# Patient Record
Sex: FEMALE | Race: WHITE | Employment: OTHER | URBAN - METROPOLITAN AREA
[De-identification: names, ages, dates, MRNs, and addresses within clinical notes are randomized per-mention and may not be internally consistent; named-entity substitution may affect disease eponyms.]

---

## 2017-08-29 PROBLEM — R30.0 DYSURIA: Status: ACTIVE | Noted: 2017-08-29

## 2017-08-29 PROBLEM — I10 ESSENTIAL HYPERTENSION: Status: ACTIVE | Noted: 2017-08-29

## 2017-08-29 PROBLEM — N18.30 CHRONIC KIDNEY DISEASE, STAGE III (MODERATE) (HCC): Status: ACTIVE | Noted: 2017-08-29

## 2017-08-29 PROBLEM — M06.09 RHEUMATOID ARTHRITIS OF MULTIPLE SITES WITH NEGATIVE RHEUMATOID FACTOR (HCC): Status: ACTIVE | Noted: 2017-08-29

## 2017-08-29 PROBLEM — N17.1 ACUTE RENAL FAILURE WITH ACUTE CORTICAL NECROSIS (HCC): Status: ACTIVE | Noted: 2017-08-29

## 2017-09-06 ENCOUNTER — HOSPITAL ENCOUNTER (OUTPATIENT)
Dept: WOMENS IMAGING | Age: 72
Discharge: HOME | End: 2017-09-06
Attending: INTERNAL MEDICINE | Admitting: INTERNAL MEDICINE

## 2017-09-06 DIAGNOSIS — Z12.31 SCREENING MAMMOGRAM, ENCOUNTER FOR: ICD-10-CM

## 2017-09-06 DIAGNOSIS — Z78.0 ASYMPTOMATIC MENOPAUSAL STATE: ICD-10-CM

## 2017-09-12 ENCOUNTER — HOSPITAL ENCOUNTER (OUTPATIENT)
Dept: ULTRASOUND IMAGING | Age: 72
Discharge: HOME | End: 2017-09-12
Attending: INTERNAL MEDICINE | Admitting: INTERNAL MEDICINE

## 2017-09-12 DIAGNOSIS — N64.89 BREAST ASYMMETRY: ICD-10-CM

## 2017-09-12 DIAGNOSIS — R92.8 ABNORMAL MAMMOGRAM: ICD-10-CM

## 2018-04-21 PROBLEM — R79.89 ELEVATED TROPONIN: Status: ACTIVE | Noted: 2018-04-21

## 2018-04-21 PROBLEM — R77.8 ELEVATED TROPONIN: Status: ACTIVE | Noted: 2018-04-21

## 2018-04-21 PROBLEM — I50.9 CHF WITH UNKNOWN LVEF (HCC): Status: ACTIVE | Noted: 2018-04-21

## 2018-04-22 PROBLEM — E87.6 HYPOKALEMIA: Status: ACTIVE | Noted: 2018-04-22

## 2018-04-22 PROBLEM — D64.9 ANEMIA: Status: ACTIVE | Noted: 2018-04-22

## 2018-04-22 PROBLEM — I25.10 CAD (CORONARY ARTERY DISEASE): Status: ACTIVE | Noted: 2018-04-22

## 2018-04-23 PROBLEM — R79.89 ELEVATED TROPONIN: Status: RESOLVED | Noted: 2018-04-21 | Resolved: 2018-04-23

## 2018-04-23 PROBLEM — R77.8 ELEVATED TROPONIN: Status: RESOLVED | Noted: 2018-04-21 | Resolved: 2018-04-23

## 2018-04-23 PROBLEM — E87.6 HYPOKALEMIA: Status: RESOLVED | Noted: 2018-04-22 | Resolved: 2018-04-23

## 2018-04-24 ENCOUNTER — CARE COORDINATION (OUTPATIENT)
Dept: CASE MANAGEMENT | Age: 73
End: 2018-04-24

## 2018-04-25 ENCOUNTER — CARE COORDINATION (OUTPATIENT)
Dept: CASE MANAGEMENT | Age: 73
End: 2018-04-25

## 2018-05-01 ENCOUNTER — CARE COORDINATION (OUTPATIENT)
Dept: CASE MANAGEMENT | Age: 73
End: 2018-05-01

## 2018-05-03 ENCOUNTER — CARE COORDINATION (OUTPATIENT)
Dept: CASE MANAGEMENT | Age: 73
End: 2018-05-03

## 2018-05-14 ENCOUNTER — OFFICE VISIT (OUTPATIENT)
Dept: CARDIOLOGY CLINIC | Age: 73
End: 2018-05-14

## 2018-05-14 VITALS
WEIGHT: 173 LBS | HEART RATE: 72 BPM | HEIGHT: 63 IN | SYSTOLIC BLOOD PRESSURE: 132 MMHG | BODY MASS INDEX: 30.65 KG/M2 | DIASTOLIC BLOOD PRESSURE: 70 MMHG

## 2018-05-14 DIAGNOSIS — Z95.9 STATUS POST ARTERIAL STENT: Primary | ICD-10-CM

## 2018-05-14 DIAGNOSIS — N18.30 CHRONIC KIDNEY DISEASE, STAGE III (MODERATE) (HCC): ICD-10-CM

## 2018-05-14 DIAGNOSIS — I50.22 CHRONIC SYSTOLIC CONGESTIVE HEART FAILURE (HCC): ICD-10-CM

## 2018-05-14 DIAGNOSIS — I25.10 ASCVD (ARTERIOSCLEROTIC CARDIOVASCULAR DISEASE): ICD-10-CM

## 2018-05-14 DIAGNOSIS — I10 ESSENTIAL HYPERTENSION: ICD-10-CM

## 2018-05-14 PROCEDURE — 1036F TOBACCO NON-USER: CPT | Performed by: INTERNAL MEDICINE

## 2018-05-14 PROCEDURE — 1123F ACP DISCUSS/DSCN MKR DOCD: CPT | Performed by: INTERNAL MEDICINE

## 2018-05-14 PROCEDURE — 93000 ELECTROCARDIOGRAM COMPLETE: CPT | Performed by: INTERNAL MEDICINE

## 2018-05-14 PROCEDURE — G8427 DOCREV CUR MEDS BY ELIG CLIN: HCPCS | Performed by: INTERNAL MEDICINE

## 2018-05-14 PROCEDURE — 4040F PNEUMOC VAC/ADMIN/RCVD: CPT | Performed by: INTERNAL MEDICINE

## 2018-05-14 PROCEDURE — 99214 OFFICE O/P EST MOD 30 MIN: CPT | Performed by: INTERNAL MEDICINE

## 2018-05-14 PROCEDURE — 3017F COLORECTAL CA SCREEN DOC REV: CPT | Performed by: INTERNAL MEDICINE

## 2018-05-14 PROCEDURE — G8598 ASA/ANTIPLAT THER USED: HCPCS | Performed by: INTERNAL MEDICINE

## 2018-05-14 PROCEDURE — G8399 PT W/DXA RESULTS DOCUMENT: HCPCS | Performed by: INTERNAL MEDICINE

## 2018-05-14 PROCEDURE — 3014F SCREEN MAMMO DOC REV: CPT | Performed by: INTERNAL MEDICINE

## 2018-05-14 PROCEDURE — 1111F DSCHRG MED/CURRENT MED MERGE: CPT | Performed by: INTERNAL MEDICINE

## 2018-05-14 PROCEDURE — 1090F PRES/ABSN URINE INCON ASSESS: CPT | Performed by: INTERNAL MEDICINE

## 2018-05-14 PROCEDURE — G8417 CALC BMI ABV UP PARAM F/U: HCPCS | Performed by: INTERNAL MEDICINE

## 2018-05-14 RX ORDER — METOPROLOL SUCCINATE 100 MG/1
100 TABLET, EXTENDED RELEASE ORAL DAILY
Qty: 30 TABLET | Refills: 3 | Status: SHIPPED | OUTPATIENT
Start: 2018-05-14 | End: 2018-09-04 | Stop reason: ALTCHOICE

## 2018-05-14 RX ORDER — ASPIRIN 81 MG/1
81 TABLET, CHEWABLE ORAL DAILY
Qty: 30 TABLET | Refills: 0 | Status: SHIPPED | OUTPATIENT
Start: 2018-05-14 | End: 2018-06-12 | Stop reason: SDUPTHER

## 2018-05-14 RX ORDER — NIFEDIPINE 60 MG/1
1 TABLET, FILM COATED, EXTENDED RELEASE ORAL DAILY
COMMUNITY
Start: 2018-04-24 | End: 2018-05-14 | Stop reason: SDUPTHER

## 2018-05-14 RX ORDER — PRASUGREL 10 MG/1
10 TABLET, FILM COATED ORAL DAILY
Qty: 30 TABLET | Refills: 3 | Status: SHIPPED | OUTPATIENT
Start: 2018-05-14 | End: 2018-09-04 | Stop reason: ALTCHOICE

## 2018-05-14 RX ORDER — NIFEDIPINE 60 MG/1
60 TABLET, FILM COATED, EXTENDED RELEASE ORAL DAILY
Qty: 30 TABLET | Refills: 5 | Status: SHIPPED | OUTPATIENT
Start: 2018-05-14 | End: 2018-09-04 | Stop reason: ALTCHOICE

## 2018-05-14 RX ORDER — ESTRADIOL 0.1 MG/G
CREAM VAGINAL DAILY
COMMUNITY
Start: 2018-02-20 | End: 2018-09-04 | Stop reason: ALTCHOICE

## 2018-05-22 ENCOUNTER — TELEPHONE (OUTPATIENT)
Dept: CARDIOLOGY CLINIC | Age: 73
End: 2018-05-22

## 2018-05-23 ENCOUNTER — TELEPHONE (OUTPATIENT)
Dept: CARDIOLOGY CLINIC | Age: 73
End: 2018-05-23

## 2018-06-11 ENCOUNTER — TELEPHONE (OUTPATIENT)
Dept: CARDIOLOGY CLINIC | Age: 73
End: 2018-06-11

## 2018-06-11 ENCOUNTER — PROCEDURE VISIT (OUTPATIENT)
Dept: CARDIOLOGY CLINIC | Age: 73
End: 2018-06-11

## 2018-06-11 DIAGNOSIS — I10 ESSENTIAL HYPERTENSION: ICD-10-CM

## 2018-06-11 DIAGNOSIS — I25.10 ASCVD (ARTERIOSCLEROTIC CARDIOVASCULAR DISEASE): ICD-10-CM

## 2018-06-11 DIAGNOSIS — I50.22 CHRONIC SYSTOLIC CONGESTIVE HEART FAILURE (HCC): Primary | ICD-10-CM

## 2018-06-11 PROCEDURE — 93308 TTE F-UP OR LMTD: CPT | Performed by: INTERNAL MEDICINE

## 2018-06-12 RX ORDER — ASPIRIN 81 MG/1
81 TABLET, CHEWABLE ORAL DAILY
Qty: 30 TABLET | Refills: 5 | Status: SHIPPED | OUTPATIENT
Start: 2018-06-12 | End: 2019-05-21 | Stop reason: ALTCHOICE

## 2018-08-15 ENCOUNTER — TELEPHONE (OUTPATIENT)
Dept: CARDIOLOGY CLINIC | Age: 73
End: 2018-08-15

## 2018-08-16 NOTE — TELEPHONE ENCOUNTER
I left a message for pt to call me back. I would like to see if she would like to go to [employer], if so she can call [phone].

## 2018-09-04 ENCOUNTER — OFFICE VISIT (OUTPATIENT)
Dept: CARDIOLOGY CLINIC | Age: 73
End: 2018-09-04

## 2018-09-04 VITALS
HEIGHT: 63 IN | WEIGHT: 175 LBS | HEART RATE: 72 BPM | DIASTOLIC BLOOD PRESSURE: 70 MMHG | BODY MASS INDEX: 31.01 KG/M2 | SYSTOLIC BLOOD PRESSURE: 146 MMHG

## 2018-09-04 DIAGNOSIS — I50.32 CHRONIC DIASTOLIC CONGESTIVE HEART FAILURE (HCC): ICD-10-CM

## 2018-09-04 DIAGNOSIS — I25.10 ASCVD (ARTERIOSCLEROTIC CARDIOVASCULAR DISEASE): Primary | ICD-10-CM

## 2018-09-04 DIAGNOSIS — I10 ESSENTIAL HYPERTENSION: ICD-10-CM

## 2018-09-04 PROCEDURE — 99214 OFFICE O/P EST MOD 30 MIN: CPT | Performed by: INTERNAL MEDICINE

## 2018-09-04 PROCEDURE — 1101F PT FALLS ASSESS-DOCD LE1/YR: CPT | Performed by: INTERNAL MEDICINE

## 2018-09-04 PROCEDURE — 1090F PRES/ABSN URINE INCON ASSESS: CPT | Performed by: INTERNAL MEDICINE

## 2018-09-04 PROCEDURE — G8598 ASA/ANTIPLAT THER USED: HCPCS | Performed by: INTERNAL MEDICINE

## 2018-09-04 PROCEDURE — 3014F SCREEN MAMMO DOC REV: CPT | Performed by: INTERNAL MEDICINE

## 2018-09-04 PROCEDURE — G8399 PT W/DXA RESULTS DOCUMENT: HCPCS | Performed by: INTERNAL MEDICINE

## 2018-09-04 PROCEDURE — G8417 CALC BMI ABV UP PARAM F/U: HCPCS | Performed by: INTERNAL MEDICINE

## 2018-09-04 PROCEDURE — 3017F COLORECTAL CA SCREEN DOC REV: CPT | Performed by: INTERNAL MEDICINE

## 2018-09-04 PROCEDURE — 1036F TOBACCO NON-USER: CPT | Performed by: INTERNAL MEDICINE

## 2018-09-04 PROCEDURE — 1123F ACP DISCUSS/DSCN MKR DOCD: CPT | Performed by: INTERNAL MEDICINE

## 2018-09-04 PROCEDURE — G8427 DOCREV CUR MEDS BY ELIG CLIN: HCPCS | Performed by: INTERNAL MEDICINE

## 2018-09-04 PROCEDURE — 4040F PNEUMOC VAC/ADMIN/RCVD: CPT | Performed by: INTERNAL MEDICINE

## 2018-09-04 RX ORDER — NIFEDIPINE 90 MG/1
90 TABLET, EXTENDED RELEASE ORAL DAILY
Qty: 30 TABLET | Refills: 3 | Status: ON HOLD | OUTPATIENT
Start: 2018-09-04 | End: 2020-12-14 | Stop reason: HOSPADM

## 2018-09-04 RX ORDER — CLOPIDOGREL BISULFATE 75 MG/1
75 TABLET ORAL DAILY
Qty: 30 TABLET | Refills: 3 | Status: SHIPPED | OUTPATIENT
Start: 2018-09-04 | End: 2019-01-02 | Stop reason: SDUPTHER

## 2018-09-04 RX ORDER — METOPROLOL SUCCINATE 50 MG/1
50 TABLET, EXTENDED RELEASE ORAL DAILY
COMMUNITY
End: 2019-05-07 | Stop reason: ALTCHOICE

## 2018-09-04 NOTE — PROGRESS NOTES
last echo.  Hypertension      History reviewed. No pertinent surgical history. Family History   Problem Relation Age of Onset   [facility] Cancer Mother     Diabetes Mother     Coronary Art Dis Father     Heart Attack Father     Heart Disease Father     Coronary Art Dis Sister     Heart Attack Sister     Heart Disease Sister     Stroke Sister     High Blood Pressure Brother     High Cholesterol Brother     Kidney Disease Brother      Social History   Substance Use Topics    Smoking status: Never Smoker    Smokeless tobacco: Never Used    Alcohol use No        Review of Systems:   · Constitutional: No Fever or Weight Loss   · Eyes: No Decreased Vision  · ENT: No Headaches, Hearing Loss or Vertigo  · Cardiovascular: as per note above   · Respiratory: No cough or wheezing and as per note above. · Gastrointestinal: No abdominal pain, appetite loss, blood in stools, constipation, diarrhea or heartburn  · Genitourinary: No dysuria, trouble voiding, or hematuria  · Musculoskeletal:  None  · Integumentary: No rash or pruritis  · Neurological: No TIA or stroke symptoms  · Psychiatric: No anxiety or depression  · Endocrine: No malaise, fatigue or temperature intolerance  · Hematologic/Lymphatic: No bleeding problems, blood clots or swollen lymph nodes  · Allergic/Immunologic: No nasal congestion or hives    Objective:      Physical Exam:  BP (!) 146/70 (Site: Left Arm, Position: Sitting, Cuff Size: Large Adult)   Pulse 72   Ht 5' 3\" (1.6 m)   Wt 175 lb (79.4 kg)   BMI 31.00 kg/m²   Wt Readings from Last 3 Encounters:   09/04/18 175 lb (79.4 kg)   05/14/18 173 lb (78.5 kg)   04/21/18 167 lb 15.9 oz (76.2 kg)     Body mass index is 31 kg/m². Vitals:    09/04/18 1017   BP: (!) 146/70   Pulse:         General Appearance:  No distress, conversant  Constitutional:  Well developed, Well nourished, No acute distress, Non-toxic appearance.    HENT:  Normocephalic, Atraumatic, Bilateral external ears normal, Oropharynx

## 2018-09-11 ENCOUNTER — HOSPITAL ENCOUNTER (OUTPATIENT)
Dept: WOMENS IMAGING | Age: 73
Discharge: HOME | End: 2018-09-11
Attending: INTERNAL MEDICINE | Admitting: INTERNAL MEDICINE

## 2018-09-11 DIAGNOSIS — Z12.31 ENCOUNTER FOR SCREENING MAMMOGRAM FOR BREAST CANCER: ICD-10-CM

## 2018-09-11 DIAGNOSIS — N95.1 SYMPTOMATIC MENOPAUSAL OR FEMALE CLIMACTERIC STATES: ICD-10-CM

## 2019-01-02 RX ORDER — CLOPIDOGREL BISULFATE 75 MG/1
75 TABLET ORAL DAILY
Qty: 30 TABLET | Refills: 6 | Status: SHIPPED | OUTPATIENT
Start: 2019-01-02 | End: 2019-01-03 | Stop reason: SDUPTHER

## 2019-01-03 RX ORDER — CLOPIDOGREL BISULFATE 75 MG/1
75 TABLET ORAL DAILY
Qty: 90 TABLET | Refills: 3 | Status: SHIPPED | OUTPATIENT
Start: 2019-01-03 | End: 2020-03-25 | Stop reason: SDUPTHER

## 2019-01-11 ENCOUNTER — HOSPITAL ENCOUNTER (OUTPATIENT)
Age: 74
Discharge: HOME | End: 2019-01-11
Payer: MEDICARE

## 2019-01-11 DIAGNOSIS — I50.32 CHRONIC DIASTOLIC CONGESTIVE HEART FAILURE (HCC): ICD-10-CM

## 2019-01-11 DIAGNOSIS — M06.09 RHEUMATOID ARTHRITIS OF MULTIPLE SITES WITH NEGATIVE RHEUMATOID FACTOR (HCC): ICD-10-CM

## 2019-01-11 DIAGNOSIS — N18.30 CHRONIC KIDNEY DISEASE, STAGE III (MODERATE) (HCC): ICD-10-CM

## 2019-01-11 DIAGNOSIS — I10 ESSENTIAL HYPERTENSION: ICD-10-CM

## 2019-01-11 DIAGNOSIS — R30.0 DYSURIA: ICD-10-CM

## 2019-01-11 DIAGNOSIS — I25.10 ASCVD (ARTERIOSCLEROTIC CARDIOVASCULAR DISEASE): ICD-10-CM

## 2019-01-11 LAB
ALT SERPL-CCNC: 23 U/L (ref 10–40)
ANION GAP SERPL CALCULATED.3IONS-SCNC: 15 MMOL/L (ref 4–16)
AST SERPL-CCNC: 31 IU/L (ref 15–37)
BACTERIA: NEGATIVE /HPF
BASOPHILS ABSOLUTE: 0 K/CU MM
BASOPHILS RELATIVE PERCENT: 0.4 % (ref 0–1)
BILIRUBIN URINE: NEGATIVE MG/DL
BLOOD, URINE: ABNORMAL
BUN BLDV-MCNC: 20 MG/DL (ref 6–23)
BUN BLDV-MCNC: 20 MG/DL (ref 6–23)
CALCIUM SERPL-MCNC: 9.4 MG/DL (ref 8.3–10.6)
CHLORIDE BLD-SCNC: 99 MMOL/L (ref 99–110)
CLARITY: CLEAR
CO2: 25 MMOL/L (ref 21–32)
COLOR: YELLOW
CREAT SERPL-MCNC: 1 MG/DL (ref 0.6–1.1)
CREAT SERPL-MCNC: 1 MG/DL (ref 0.6–1.1)
DIFFERENTIAL TYPE: ABNORMAL
EOSINOPHILS ABSOLUTE: 0.1 K/CU MM
EOSINOPHILS RELATIVE PERCENT: 2.4 % (ref 0–3)
ERYTHROCYTE SEDIMENTATION RATE: 40 MM/HR (ref 0–30)
GFR AFRICAN AMERICAN: >60 ML/MIN/1.73M2
GFR AFRICAN AMERICAN: >60 ML/MIN/1.73M2
GFR NON-AFRICAN AMERICAN: 54 ML/MIN/1.73M2
GFR NON-AFRICAN AMERICAN: 54 ML/MIN/1.73M2
GLUCOSE BLD-MCNC: 99 MG/DL (ref 70–99)
GLUCOSE, URINE: NEGATIVE MG/DL
HCT VFR BLD CALC: 40.4 % (ref 37–47)
HEMOGLOBIN: 12.1 GM/DL (ref 12.5–16)
IMMATURE NEUTROPHIL %: 0.2 % (ref 0–0.43)
KETONES, URINE: NEGATIVE MG/DL
LEUKOCYTE ESTERASE, URINE: ABNORMAL
LYMPHOCYTES ABSOLUTE: 1.1 K/CU MM
LYMPHOCYTES RELATIVE PERCENT: 23.9 % (ref 24–44)
MCH RBC QN AUTO: 30.6 PG (ref 27–31)
MCHC RBC AUTO-ENTMCNC: 30 % (ref 32–36)
MCV RBC AUTO: 102 FL (ref 78–100)
MONOCYTES ABSOLUTE: 0.5 K/CU MM
MONOCYTES RELATIVE PERCENT: 10 % (ref 0–4)
MUCUS: ABNORMAL HPF
NITRITE URINE, QUANTITATIVE: NEGATIVE
NUCLEATED RBC %: 0 %
PDW BLD-RTO: 14.5 % (ref 11.7–14.9)
PH, URINE: 5 (ref 5–8)
PLATELET # BLD: 243 K/CU MM (ref 140–440)
PMV BLD AUTO: 13 FL (ref 7.5–11.1)
POTASSIUM SERPL-SCNC: 4.4 MMOL/L (ref 3.5–5.1)
PROTEIN UA: 30 MG/DL
RBC # BLD: 3.96 M/CU MM (ref 4.2–5.4)
RBC URINE: 38 /HPF (ref 0–6)
SEGMENTED NEUTROPHILS ABSOLUTE COUNT: 2.9 K/CU MM
SEGMENTED NEUTROPHILS RELATIVE PERCENT: 63.1 % (ref 36–66)
SODIUM BLD-SCNC: 139 MMOL/L (ref 135–145)
SPECIFIC GRAVITY UA: 1.02 (ref 1–1.03)
SQUAMOUS EPITHELIAL: 2 /HPF
TOTAL IMMATURE NEUTOROPHIL: 0.01 K/CU MM
TOTAL NUCLEATED RBC: 0 K/CU MM
TRANSITIONAL EPITHELIAL: <1 /HPF
TRICHOMONAS: ABNORMAL /HPF
UROBILINOGEN, URINE: NORMAL MG/DL (ref 0.2–1)
WBC # BLD: 4.6 K/CU MM (ref 4–10.5)
WBC UA: 29 /HPF (ref 0–5)

## 2019-01-11 PROCEDURE — 81001 URINALYSIS AUTO W/SCOPE: CPT

## 2019-01-11 PROCEDURE — 87086 URINE CULTURE/COLONY COUNT: CPT

## 2019-01-11 PROCEDURE — 85025 COMPLETE CBC W/AUTO DIFF WBC: CPT

## 2019-01-11 PROCEDURE — 84460 ALANINE AMINO (ALT) (SGPT): CPT

## 2019-01-11 PROCEDURE — 82565 ASSAY OF CREATININE: CPT

## 2019-01-11 PROCEDURE — 84520 ASSAY OF UREA NITROGEN: CPT

## 2019-01-11 PROCEDURE — 36415 COLL VENOUS BLD VENIPUNCTURE: CPT

## 2019-01-11 PROCEDURE — 84450 TRANSFERASE (AST) (SGOT): CPT

## 2019-01-11 PROCEDURE — 85652 RBC SED RATE AUTOMATED: CPT

## 2019-01-11 PROCEDURE — 80048 BASIC METABOLIC PNL TOTAL CA: CPT

## 2019-01-13 LAB
CULTURE: NORMAL
Lab: NORMAL
REPORT STATUS: NORMAL
SPECIMEN: NORMAL

## 2019-03-12 ENCOUNTER — OFFICE VISIT (OUTPATIENT)
Dept: CARDIOLOGY CLINIC | Age: 74
End: 2019-03-12
Payer: MEDICARE

## 2019-03-12 VITALS
HEIGHT: 63 IN | WEIGHT: 182 LBS | HEART RATE: 74 BPM | DIASTOLIC BLOOD PRESSURE: 68 MMHG | BODY MASS INDEX: 32.25 KG/M2 | SYSTOLIC BLOOD PRESSURE: 130 MMHG

## 2019-03-12 DIAGNOSIS — I25.10 ASCVD (ARTERIOSCLEROTIC CARDIOVASCULAR DISEASE): Primary | ICD-10-CM

## 2019-03-12 DIAGNOSIS — M25.473 ANKLE SWELLING DETERMINED BY EXAMINATION: ICD-10-CM

## 2019-03-12 DIAGNOSIS — N18.30 CHRONIC KIDNEY DISEASE, STAGE III (MODERATE) (HCC): ICD-10-CM

## 2019-03-12 DIAGNOSIS — I10 ESSENTIAL HYPERTENSION: ICD-10-CM

## 2019-03-12 DIAGNOSIS — R30.0 DYSURIA: ICD-10-CM

## 2019-03-12 DIAGNOSIS — I50.32 CHRONIC DIASTOLIC CONGESTIVE HEART FAILURE (HCC): ICD-10-CM

## 2019-03-12 PROCEDURE — G8427 DOCREV CUR MEDS BY ELIG CLIN: HCPCS | Performed by: INTERNAL MEDICINE

## 2019-03-12 PROCEDURE — G8484 FLU IMMUNIZE NO ADMIN: HCPCS | Performed by: INTERNAL MEDICINE

## 2019-03-12 PROCEDURE — 1101F PT FALLS ASSESS-DOCD LE1/YR: CPT | Performed by: INTERNAL MEDICINE

## 2019-03-12 PROCEDURE — 99214 OFFICE O/P EST MOD 30 MIN: CPT | Performed by: INTERNAL MEDICINE

## 2019-03-12 PROCEDURE — G8417 CALC BMI ABV UP PARAM F/U: HCPCS | Performed by: INTERNAL MEDICINE

## 2019-03-12 PROCEDURE — 1090F PRES/ABSN URINE INCON ASSESS: CPT | Performed by: INTERNAL MEDICINE

## 2019-03-12 PROCEDURE — 1036F TOBACCO NON-USER: CPT | Performed by: INTERNAL MEDICINE

## 2019-03-12 PROCEDURE — G8399 PT W/DXA RESULTS DOCUMENT: HCPCS | Performed by: INTERNAL MEDICINE

## 2019-03-12 PROCEDURE — 1123F ACP DISCUSS/DSCN MKR DOCD: CPT | Performed by: INTERNAL MEDICINE

## 2019-03-12 PROCEDURE — G8598 ASA/ANTIPLAT THER USED: HCPCS | Performed by: INTERNAL MEDICINE

## 2019-03-12 PROCEDURE — 3017F COLORECTAL CA SCREEN DOC REV: CPT | Performed by: INTERNAL MEDICINE

## 2019-03-12 PROCEDURE — 4040F PNEUMOC VAC/ADMIN/RCVD: CPT | Performed by: INTERNAL MEDICINE

## 2019-03-27 ENCOUNTER — PROCEDURE VISIT (OUTPATIENT)
Dept: CARDIOLOGY CLINIC | Age: 74
End: 2019-03-27
Payer: MEDICARE

## 2019-03-27 DIAGNOSIS — M25.473 ANKLE SWELLING DETERMINED BY EXAMINATION: Primary | ICD-10-CM

## 2019-03-27 DIAGNOSIS — R30.0 DYSURIA: ICD-10-CM

## 2019-03-27 DIAGNOSIS — I25.10 ASCVD (ARTERIOSCLEROTIC CARDIOVASCULAR DISEASE): ICD-10-CM

## 2019-03-27 DIAGNOSIS — N18.30 CHRONIC KIDNEY DISEASE, STAGE III (MODERATE) (HCC): ICD-10-CM

## 2019-03-27 DIAGNOSIS — I50.32 CHRONIC DIASTOLIC CONGESTIVE HEART FAILURE (HCC): ICD-10-CM

## 2019-03-27 DIAGNOSIS — I10 ESSENTIAL HYPERTENSION: ICD-10-CM

## 2019-03-27 PROCEDURE — 93970 EXTREMITY STUDY: CPT | Performed by: INTERNAL MEDICINE

## 2019-03-29 ENCOUNTER — TELEPHONE (OUTPATIENT)
Dept: CARDIOLOGY CLINIC | Age: 74
End: 2019-03-29

## 2019-05-07 ENCOUNTER — OFFICE VISIT (OUTPATIENT)
Dept: CARDIOLOGY CLINIC | Age: 74
End: 2019-05-07
Payer: MEDICARE

## 2019-05-07 VITALS
DIASTOLIC BLOOD PRESSURE: 80 MMHG | WEIGHT: 176 LBS | BODY MASS INDEX: 31.18 KG/M2 | HEIGHT: 63 IN | SYSTOLIC BLOOD PRESSURE: 176 MMHG | HEART RATE: 74 BPM

## 2019-05-07 DIAGNOSIS — I25.10 ASCVD (ARTERIOSCLEROTIC CARDIOVASCULAR DISEASE): ICD-10-CM

## 2019-05-07 DIAGNOSIS — I10 ESSENTIAL HYPERTENSION: ICD-10-CM

## 2019-05-07 DIAGNOSIS — I50.32 CHRONIC DIASTOLIC CONGESTIVE HEART FAILURE (HCC): ICD-10-CM

## 2019-05-07 DIAGNOSIS — M25.473 ANKLE SWELLING DETERMINED BY EXAMINATION: Primary | ICD-10-CM

## 2019-05-07 DIAGNOSIS — N18.30 CHRONIC KIDNEY DISEASE, STAGE III (MODERATE) (HCC): ICD-10-CM

## 2019-05-07 PROCEDURE — G8427 DOCREV CUR MEDS BY ELIG CLIN: HCPCS | Performed by: INTERNAL MEDICINE

## 2019-05-07 PROCEDURE — G8598 ASA/ANTIPLAT THER USED: HCPCS | Performed by: INTERNAL MEDICINE

## 2019-05-07 PROCEDURE — 1090F PRES/ABSN URINE INCON ASSESS: CPT | Performed by: INTERNAL MEDICINE

## 2019-05-07 PROCEDURE — 99214 OFFICE O/P EST MOD 30 MIN: CPT | Performed by: INTERNAL MEDICINE

## 2019-05-07 PROCEDURE — 4040F PNEUMOC VAC/ADMIN/RCVD: CPT | Performed by: INTERNAL MEDICINE

## 2019-05-07 PROCEDURE — 1123F ACP DISCUSS/DSCN MKR DOCD: CPT | Performed by: INTERNAL MEDICINE

## 2019-05-07 PROCEDURE — G8399 PT W/DXA RESULTS DOCUMENT: HCPCS | Performed by: INTERNAL MEDICINE

## 2019-05-07 PROCEDURE — 1036F TOBACCO NON-USER: CPT | Performed by: INTERNAL MEDICINE

## 2019-05-07 PROCEDURE — 3017F COLORECTAL CA SCREEN DOC REV: CPT | Performed by: INTERNAL MEDICINE

## 2019-05-07 PROCEDURE — G8417 CALC BMI ABV UP PARAM F/U: HCPCS | Performed by: INTERNAL MEDICINE

## 2019-05-07 RX ORDER — CARVEDILOL 25 MG/1
25 TABLET ORAL DAILY
Qty: 90 TABLET | Refills: 1 | Status: SHIPPED | OUTPATIENT
Start: 2019-05-07 | End: 2019-10-28 | Stop reason: SDUPTHER

## 2019-05-07 RX ORDER — CHLORTHALIDONE 25 MG/1
25 TABLET ORAL DAILY
Qty: 30 TABLET | Refills: 3 | Status: SHIPPED | OUTPATIENT
Start: 2019-05-07 | End: 2019-05-21 | Stop reason: SDUPTHER

## 2019-05-07 NOTE — PROGRESS NOTES
CARDIOLOGY  NOTE    Chief Complaint: follow-up for cardiomyopathy at this for cardiovascular disease    HPI:   [facility] is a 74y.o. year old who has history as noted below. She is doing well. but bp at home is running in 150's  she underwent PCI of the LAD in April 2018 when she presented to [facility] and reduced EF . She was switched to plavix due to cost issues . She has no chest pain or shortness of breath now. She notices some ankle swelling . She is feeling good , no chest pain , breathing is good notices some anjkle swelling      Current Outpatient Medications   Medication Sig Dispense Refill    Compression Stockings MISC by Does not apply route 15 to 20 mmHG 1 each 0    clopidogrel (PLAVIX) 75 MG tablet Take 1 tablet by mouth daily 90 tablet 3    hydrochlorothiazide (HYDRODIURIL) 12.5 MG tablet Take 1 tablet by mouth every other day 45 tablet 3    NIFEdipine (PROCARDIA XL) 90 MG extended release tablet Take 1 tablet by mouth daily (Patient taking differently: Take 60 mg by mouth daily ) 30 tablet 3    aspirin 81 MG chewable tablet Take 1 tablet by mouth daily 30 tablet 5    nitroGLYCERIN (NITROSTAT) 0.4 MG SL tablet up to max of 3 total doses. If no relief after 1 dose, call 911. 25 tablet 2    atorvastatin (LIPITOR) 80 MG tablet Take 1 tablet by mouth nightly 30 tablet 2    losartan (COZAAR) 100 MG tablet Take 1 tablet by mouth daily 30 tablet 0    hydroxychloroquine (PLAQUENIL) 200 MG tablet Take 200 mg by mouth daily      calcium carbonate (CALCIUM ANTACID) 500 MG chewable tablet Take 1 tablet by mouth 2 times daily       No current facility-administered medications for this visit.         Allergies:   Pcn [penicillins]    Patient History:  Past Medical History:   Diagnosis Date    Arthritis     CAD (coronary artery disease) 4/22/2018    Chronic kidney disease, stage III (moderate) ([facility] [address] 8/29/2017    H/O Doppler lower venous ultrasound 03/27/2019    No DVT or SVT, Significant reflux in RGSV and LGSV    H/O echocardiogram 06/11/2018    Limited study to evaluate LV function. Left ventricular size is normal Lv function is normal EF 50-55% EF has improved since last echo.  Hypertension      No past surgical history on file. Family History   Problem Relation Age of Onset   [facility] Cancer Mother     Diabetes Mother     Coronary Art Dis Father     Heart Attack Father     Heart Disease Father     Coronary Art Dis Sister     Heart Attack Sister     Heart Disease Sister     Stroke Sister     High Blood Pressure Brother     High Cholesterol Brother     Kidney Disease Brother      Social History     Tobacco Use    Smoking status: Never Smoker    Smokeless tobacco: Never Used   Substance Use Topics    Alcohol use: No        Review of Systems:   · Constitutional: No Fever or Weight Loss   · Eyes: No Decreased Vision  · ENT: No Headaches, Hearing Loss or Vertigo  · Cardiovascular: as per note above   · Respiratory: No cough or wheezing and as per note above. · Gastrointestinal: No abdominal pain, appetite loss, blood in stools, constipation, diarrhea or heartburn  · Genitourinary: No dysuria, trouble voiding, or hematuria  · Musculoskeletal:  None  · Integumentary: No rash or pruritis  · Neurological: No TIA or stroke symptoms  · Psychiatric: No anxiety or depression  · Endocrine: No malaise, fatigue or temperature intolerance  · Hematologic/Lymphatic: No bleeding problems, blood clots or swollen lymph nodes  · Allergic/Immunologic: No nasal congestion or hives    Objective:      Physical Exam:  BP (!) 176/80 (Site: Left Upper Arm, Position: Sitting, Cuff Size: Medium Adult)   Pulse 74   Ht 5' 3\" (1.6 m)   Wt 176 lb (79.8 kg)   BMI 31.18 kg/m²   Wt Readings from Last 3 Encounters:   05/07/19 176 lb (79.8 kg)   03/12/19 182 lb (82.6 kg)   01/22/19 178 lb (80.7 kg)     Body mass index is 31.18 kg/m².   Vitals:    05/07/19 1419   BP: (!) 176/80   Pulse: General Appearance:  No distress, conversant  Constitutional:  Well developed, Well nourished, No acute distress, Non-toxic appearance. HENT:  Normocephalic, Atraumatic, Bilateral external ears normal, Oropharynx moist, No oral exudates, Nose normal. Neck- Normal range of motion, No tenderness, Supple, No stridor,no apical-carotid delay  Eyes:  PERRL, EOMI, Conjunctiva normal, No discharge. Respiratory:  Normal breath sounds, No respiratory distress, No wheezing, No chest tenderness. ,no use of accessory muscles, NO crackles  Cardiovascular: (PMI) apex non displaced,no lifts no thrills,S1 and S2 audible, systolic 2/6 murmur, No signs of ankle edema, or volume overload, No evidence of JVD, No crackles  GI:  Bowel sounds normal, Soft, No tenderness, No masses, No gross visceromegaly   :  No costovertebral angle tenderness   Musculoskeletal:  No edema, no tenderness, no deformities.  Back- no tenderness  Integument:  Well hydrated, no rash   Lymphatic:  No lymphadenopathy noted   Neurologic:  Alert & oriented x 3, CN 2-12 normal, normal motor function, normal sensory function, no focal deficits noted   Psychiatric:  Speech and behavior appropriate       Medical decision making and Data review:  DATA:  Lab Results   Component Value Date    TROPONINT 1.340 ([facility]) 04/21/2018     BNP:    Lab Results   Component Value Date    PROBNP 10,146 (H) 04/21/2018     PT/INR:  No results found for: PTINR  No results found for: LABA1C  No results found for: CHOL, TRIG, HDL, LDLCALC, LDLDIRECT  Lab Results   Component Value Date    ALT 23 01/11/2019    AST 31 01/11/2019     TSH:   Lab Results   Component Value Date    TSH 0.804 08/30/2017     Cath 4/22/18   Procedure Summary   Radial Access   S/p PCI for 99 % mid and 80 % proximal LAD lesion using   overlapping BART 3.25 X38 inflated to 14 atms and 3.5 X 12   inflated 12 bam   Circ is dominant   RCA is non dominant   EF is > 55%   LMCA: short almost immediately bifurcates into be contributing to ankle swelling  Change to coreg 25 mg bid, add chlorthalidone   Call back for bp check in 1 week,]check bmp in 1 week     Dyslipidemia :  Unruly Barneyucier had lab work recently,  Lipid profile was reviewed with patient. continue high-dose statins    Counseled extensively and medication compliance urged. We discussed that for the  prevention of ASCVD our  goal is aggressive risk modification. Patient is encouraged to exercise even a brisk walk for 30 minutes  at least 3 to 4 times a week   Various goals were discussed and questions answered. Continue current medications. Appropriate prescriptions are addressed and refills ordered. Questions answered and patient verbalizes understanding. Call for any problems, questions, or concerns. Continue all other medications of all above medical condition listed as is. Return in about 6 months (around 11/7/2019). Please note this report has been partially produced using speech recognition software and may contain errors related to that system including errors in grammar, punctuation, and spelling, as well as words and phrases that may be inappropriate.  If there are any questions or concerns please feel free to contact the dictating provider for clarification.

## 2019-05-09 ENCOUNTER — HOSPITAL ENCOUNTER (OUTPATIENT)
Age: 74
Discharge: HOME | End: 2019-05-09
Payer: MEDICARE

## 2019-05-09 LAB
ANION GAP SERPL CALCULATED.3IONS-SCNC: 12 MMOL/L (ref 4–16)
BUN BLDV-MCNC: 17 MG/DL (ref 6–23)
CALCIUM SERPL-MCNC: 9.6 MG/DL (ref 8.3–10.6)
CHLORIDE BLD-SCNC: 105 MMOL/L (ref 99–110)
CO2: 25 MMOL/L (ref 21–32)
CREAT SERPL-MCNC: 0.9 MG/DL (ref 0.6–1.1)
GFR AFRICAN AMERICAN: >60 ML/MIN/1.73M2
GFR NON-AFRICAN AMERICAN: >60 ML/MIN/1.73M2
GLUCOSE BLD-MCNC: 107 MG/DL (ref 70–99)
POTASSIUM SERPL-SCNC: 4 MMOL/L (ref 3.5–5.1)
SODIUM BLD-SCNC: 142 MMOL/L (ref 135–145)

## 2019-05-09 PROCEDURE — 36415 COLL VENOUS BLD VENIPUNCTURE: CPT

## 2019-05-09 PROCEDURE — 80048 BASIC METABOLIC PNL TOTAL CA: CPT

## 2019-05-21 ENCOUNTER — OFFICE VISIT (OUTPATIENT)
Dept: CARDIOLOGY CLINIC | Age: 74
End: 2019-05-21
Payer: MEDICARE

## 2019-05-21 VITALS
WEIGHT: 176.8 LBS | SYSTOLIC BLOOD PRESSURE: 142 MMHG | BODY MASS INDEX: 31.33 KG/M2 | HEIGHT: 63 IN | DIASTOLIC BLOOD PRESSURE: 80 MMHG | HEART RATE: 78 BPM

## 2019-05-21 DIAGNOSIS — M06.09 RHEUMATOID ARTHRITIS OF MULTIPLE SITES WITH NEGATIVE RHEUMATOID FACTOR (HCC): ICD-10-CM

## 2019-05-21 DIAGNOSIS — I10 ESSENTIAL HYPERTENSION: ICD-10-CM

## 2019-05-21 DIAGNOSIS — I25.10 ASCVD (ARTERIOSCLEROTIC CARDIOVASCULAR DISEASE): ICD-10-CM

## 2019-05-21 DIAGNOSIS — N18.30 CHRONIC KIDNEY DISEASE, STAGE III (MODERATE) (HCC): ICD-10-CM

## 2019-05-21 DIAGNOSIS — I50.32 CHRONIC DIASTOLIC CONGESTIVE HEART FAILURE (HCC): ICD-10-CM

## 2019-05-21 DIAGNOSIS — M25.473 ANKLE SWELLING DETERMINED BY EXAMINATION: Primary | ICD-10-CM

## 2019-05-21 PROCEDURE — 1036F TOBACCO NON-USER: CPT | Performed by: INTERNAL MEDICINE

## 2019-05-21 PROCEDURE — G8417 CALC BMI ABV UP PARAM F/U: HCPCS | Performed by: INTERNAL MEDICINE

## 2019-05-21 PROCEDURE — 1123F ACP DISCUSS/DSCN MKR DOCD: CPT | Performed by: INTERNAL MEDICINE

## 2019-05-21 PROCEDURE — G8598 ASA/ANTIPLAT THER USED: HCPCS | Performed by: INTERNAL MEDICINE

## 2019-05-21 PROCEDURE — 4040F PNEUMOC VAC/ADMIN/RCVD: CPT | Performed by: INTERNAL MEDICINE

## 2019-05-21 PROCEDURE — 3017F COLORECTAL CA SCREEN DOC REV: CPT | Performed by: INTERNAL MEDICINE

## 2019-05-21 PROCEDURE — G8399 PT W/DXA RESULTS DOCUMENT: HCPCS | Performed by: INTERNAL MEDICINE

## 2019-05-21 PROCEDURE — G8427 DOCREV CUR MEDS BY ELIG CLIN: HCPCS | Performed by: INTERNAL MEDICINE

## 2019-05-21 PROCEDURE — 99214 OFFICE O/P EST MOD 30 MIN: CPT | Performed by: INTERNAL MEDICINE

## 2019-05-21 PROCEDURE — 1090F PRES/ABSN URINE INCON ASSESS: CPT | Performed by: INTERNAL MEDICINE

## 2019-05-21 RX ORDER — CHLORTHALIDONE 50 MG/1
50 TABLET ORAL DAILY
Qty: 30 TABLET | Refills: 3 | Status: SHIPPED | OUTPATIENT
Start: 2019-05-21 | End: 2019-09-18 | Stop reason: SDUPTHER

## 2019-05-21 NOTE — PROGRESS NOTES
disease, stage III (moderate) ([facility] [address] 8/29/2017    H/O Doppler lower venous ultrasound 03/27/2019    No DVT or SVT, Significant reflux in RGSV and LGSV    H/O echocardiogram 06/11/2018    Limited study to evaluate LV function. Left ventricular size is normal Lv function is normal EF 50-55% EF has improved since last echo.  Hypertension      History reviewed. No pertinent surgical history. Family History   Problem Relation Age of Onset   [facility] Cancer Mother     Diabetes Mother     Coronary Art Dis Father     Heart Attack Father     Heart Disease Father     Coronary Art Dis Sister     Heart Attack Sister     Heart Disease Sister     Stroke Sister     High Blood Pressure Brother     High Cholesterol Brother     Kidney Disease Brother      Social History     Tobacco Use    Smoking status: Never Smoker    Smokeless tobacco: Never Used   Substance Use Topics    Alcohol use: No        Review of Systems:   · Constitutional: No Fever or Weight Loss   · Eyes: No Decreased Vision  · ENT: No Headaches, Hearing Loss or Vertigo  · Cardiovascular: as per note above   · Respiratory: No cough or wheezing and as per note above.    · Gastrointestinal: No abdominal pain, appetite loss, blood in stools, constipation, diarrhea or heartburn  · Genitourinary: No dysuria, trouble voiding, or hematuria  · Musculoskeletal:  None  · Integumentary: No rash or pruritis  · Neurological: No TIA or stroke symptoms  · Psychiatric: No anxiety or depression  · Endocrine: No malaise, fatigue or temperature intolerance  · Hematologic/Lymphatic: No bleeding problems, blood clots or swollen lymph nodes  · Allergic/Immunologic: No nasal congestion or hives    Objective:      Physical Exam:  BP (!) 142/80   Pulse 78   Ht 5' 3\" (1.6 m)   Wt 176 lb 12.8 oz (80.2 kg)   BMI 31.32 kg/m²   Wt Readings from Last 3 Encounters:   05/21/19 176 lb 12.8 oz (80.2 kg)   05/07/19 176 lb (79.8 kg)   03/12/19 182 lb (82.6 kg)     Body mass index is 31.32 dominant   EF is > 55%   LMCA: short almost immediately bifurcates into Circ and LAD    LAD: 99 % mid lesion , there is diffuse 70 % to 80 % proximal hazy lesion just  before diagonal take off     LCx: dominant large vessel , gives large OM widely patent  RCA: non dominant small vessel   echo 4/23/18   Summary   Left ventricular function is abnormal,septal and apical wall akinesis   estimated EF is 35 to 40 %   Mild concentric left ventricular hypertrophy.   Grade III diastolic dysfunction.   Mildly dilated left atrium.   Right ventricular systolic pressure of 31 mm Hg.   Mild mitral regurgitation is present.   Mild tricuspid regurgitation.   No evidence of any pericardial effusion.    Echo 6/11/18   Summary   Limited Study to evaluate LV function   Left Ventricular size is Normal .   LV function is normal , EF 50-55%.   EF has improved since last echo in April 2018   No regional wall motion abnormality.  Carla [place] dilated left atrium.   No evidence of pericardial effusion.     Vein doppler 3/27/19  Summary        No evidence of DVT or SVT in the bilateral common femoral vein, femoral    vein, popliteal vein, greater saphenous vein or small saphenous vein.    Significant reflux noted of the Right GSV at the level of SFJ (2.4s).  Significant reflux noted in the Left CFV and the GSV at the level of the SFJ    (9.0s).      Recommendations        Advice thigh high pressure stockings, 20 to 30 mm of Hg.    Keep feet elevated.    Increase walking.        OV in 6 weeks             All labs, medications and tests reviewed by myself including data and history from outside source , patient and available family . Assessment & Plan:      1. Ankle swelling determined by examination    2. ASCVD (arteriosclerotic cardiovascular disease)    3. Chronic kidney disease, stage III (moderate) (HCC)    4. Chronic diastolic congestive heart failure ([address]    5. Essential hypertension    6.  Rheumatoid arthritis of multiple sites with clarification.

## 2019-09-17 ENCOUNTER — HOSPITAL ENCOUNTER (OUTPATIENT)
Dept: WOMENS IMAGING | Age: 74
Discharge: HOME | End: 2019-09-17
Payer: MEDICARE

## 2019-09-17 DIAGNOSIS — Z12.31 VISIT FOR SCREENING MAMMOGRAM: ICD-10-CM

## 2019-09-17 PROCEDURE — 77067 SCR MAMMO BI INCL CAD: CPT

## 2019-09-18 RX ORDER — CHLORTHALIDONE 50 MG/1
50 TABLET ORAL DAILY
Qty: 30 TABLET | Refills: 3 | Status: SHIPPED | OUTPATIENT
Start: 2019-09-18 | End: 2019-12-18 | Stop reason: SDUPTHER

## 2019-10-29 RX ORDER — CARVEDILOL 25 MG/1
25 TABLET ORAL DAILY
Qty: 90 TABLET | Refills: 3 | Status: SHIPPED | OUTPATIENT
Start: 2019-10-29 | End: 2020-06-02 | Stop reason: SDUPTHER

## 2019-11-19 ENCOUNTER — OFFICE VISIT (OUTPATIENT)
Dept: CARDIOLOGY CLINIC | Age: 74
End: 2019-11-19
Payer: MEDICARE

## 2019-11-19 VITALS
WEIGHT: 179.4 LBS | DIASTOLIC BLOOD PRESSURE: 52 MMHG | SYSTOLIC BLOOD PRESSURE: 94 MMHG | HEART RATE: 68 BPM | BODY MASS INDEX: 33.01 KG/M2 | HEIGHT: 62 IN

## 2019-11-19 DIAGNOSIS — E78.5 DYSLIPIDEMIA: ICD-10-CM

## 2019-11-19 DIAGNOSIS — M25.473 ANKLE SWELLING DETERMINED BY EXAMINATION: ICD-10-CM

## 2019-11-19 DIAGNOSIS — I10 ESSENTIAL HYPERTENSION: ICD-10-CM

## 2019-11-19 DIAGNOSIS — N18.30 CHRONIC KIDNEY DISEASE, STAGE III (MODERATE) (HCC): ICD-10-CM

## 2019-11-19 DIAGNOSIS — I25.10 ASCVD (ARTERIOSCLEROTIC CARDIOVASCULAR DISEASE): Primary | ICD-10-CM

## 2019-11-19 PROCEDURE — 99214 OFFICE O/P EST MOD 30 MIN: CPT | Performed by: NURSE PRACTITIONER

## 2019-12-18 RX ORDER — CHLORTHALIDONE 50 MG/1
50 TABLET ORAL DAILY
Qty: 90 TABLET | Refills: 2 | Status: SHIPPED | OUTPATIENT
Start: 2019-12-18 | End: 2020-09-09

## 2020-03-25 RX ORDER — CLOPIDOGREL BISULFATE 75 MG/1
75 TABLET ORAL DAILY
Qty: 90 TABLET | Refills: 3 | Status: SHIPPED | OUTPATIENT
Start: 2020-03-25 | End: 2020-06-02 | Stop reason: SDUPTHER

## 2020-04-03 ENCOUNTER — TELEPHONE (OUTPATIENT)
Dept: CARDIOLOGY CLINIC | Age: 75
End: 2020-04-03

## 2020-06-02 ENCOUNTER — VIRTUAL VISIT (OUTPATIENT)
Dept: CARDIOLOGY CLINIC | Age: 75
End: 2020-06-02
Payer: MEDICARE

## 2020-06-02 VITALS
HEIGHT: 61 IN | DIASTOLIC BLOOD PRESSURE: 67 MMHG | HEART RATE: 68 BPM | BODY MASS INDEX: 35.68 KG/M2 | WEIGHT: 189 LBS | SYSTOLIC BLOOD PRESSURE: 139 MMHG

## 2020-06-02 PROCEDURE — 99442 PR PHYS/QHP TELEPHONE EVALUATION 11-20 MIN: CPT | Performed by: INTERNAL MEDICINE

## 2020-06-02 RX ORDER — CARVEDILOL 25 MG/1
25 TABLET ORAL DAILY
Qty: 90 TABLET | Refills: 3 | Status: SHIPPED | OUTPATIENT
Start: 2020-06-02 | End: 2020-10-20

## 2020-06-02 RX ORDER — LOSARTAN POTASSIUM 100 MG/1
100 TABLET ORAL DAILY
Qty: 90 TABLET | Refills: 3 | Status: ON HOLD
Start: 2020-06-02 | End: 2020-12-14 | Stop reason: HOSPADM

## 2020-06-02 RX ORDER — ATORVASTATIN CALCIUM 80 MG/1
80 TABLET, FILM COATED ORAL NIGHTLY
Qty: 90 TABLET | Refills: 3 | Status: SHIPPED | OUTPATIENT
Start: 2020-06-02 | End: 2020-12-01 | Stop reason: SDUPTHER

## 2020-06-02 RX ORDER — CLOPIDOGREL BISULFATE 75 MG/1
75 TABLET ORAL DAILY
Qty: 90 TABLET | Refills: 3 | Status: SHIPPED | OUTPATIENT
Start: 2020-06-02 | End: 2020-12-01 | Stop reason: SDUPTHER

## 2020-06-02 NOTE — PROGRESS NOTES
euvolemic. She says her ankles swell  Repeat echo shows improved EF  50-55% After lad intervention . Continue  Toprol-XL. Continue losartan. She does not appear to retain water. Ankle swelling determined by examination  Doppler shows significant  Reflux, she feels better after socks, continue to  wear compression stockings     Essential hypertension  Blood pressure is much improved  , it is better  Home as per her log ,continue nifedipine to 90 mg daily . continue coreg 25 mg bid, add chlorthalidone 50 mg , stop hctz  Check labs in 1 week       Dyslipidemia :  Cesar Witt had lab work recently,  Lipid profile was reviewed with patient. continue high-dose statins , continue lipitor will check lipids , repeat labs with Dr Snowden Reach extensively and medication compliance urged. We discussed that for the  prevention of ASCVD our  goal is aggressive risk modification. Patient is encouraged to exercise even a brisk walk for 30 minutes  at least 3 to 4 times a week   Various goals were discussed and questions answered. Continue current medications. Appropriate prescriptions are addressed and refills ordered. Questions answered and patient verbalizes understanding. Call for any problems, questions, or concerns. Continue all other medications of all above medical condition listed as is. Return in about 6 months (around 12/2/2020). Please note this report has been partially produced using speech recognition software and may contain errors related to that system including errors in grammar, punctuation, and spelling, as well as words and phrases that may be inappropriate.  If there are any questions or concerns please feel free to contact the dictating provider for clarification.

## 2020-06-08 LAB
ALBUMIN SERPL-MCNC: 4.3 G/DL
ALP BLD-CCNC: 69 U/L
ALT SERPL-CCNC: 21 U/L
ANION GAP SERPL CALCULATED.3IONS-SCNC: 1.3 MMOL/L
AST SERPL-CCNC: 27 U/L
BILIRUB SERPL-MCNC: 0.2 MG/DL (ref 0.1–1.4)
BUN BLDV-MCNC: 29 MG/DL
CALCIUM SERPL-MCNC: 9.9 MG/DL
CHLORIDE BLD-SCNC: 103 MMOL/L
CHOLESTEROL, TOTAL: 135 MG/DL
CHOLESTEROL/HDL RATIO: ABNORMAL
CO2: 24 MMOL/L
CREAT SERPL-MCNC: 1.3 MG/DL
GFR CALCULATED: 40
GLUCOSE BLD-MCNC: 101 MG/DL
HDLC SERPL-MCNC: 74 MG/DL (ref 35–70)
LDL CHOLESTEROL CALCULATED: 48 MG/DL (ref 0–160)
POTASSIUM SERPL-SCNC: 3.8 MMOL/L
SODIUM BLD-SCNC: 142 MMOL/L
TOTAL PROTEIN: 7.5
TRIGL SERPL-MCNC: 63 MG/DL
VLDLC SERPL CALC-MCNC: 13 MG/DL

## 2020-09-09 RX ORDER — CHLORTHALIDONE 50 MG/1
50 TABLET ORAL DAILY
Qty: 90 TABLET | Refills: 2 | Status: SHIPPED | OUTPATIENT
Start: 2020-09-09 | End: 2020-12-01 | Stop reason: SDUPTHER

## 2020-10-19 ENCOUNTER — HOSPITAL ENCOUNTER (OUTPATIENT)
Dept: WOMENS IMAGING | Age: 75
Discharge: HOME | End: 2020-10-19
Payer: MEDICARE

## 2020-10-19 PROCEDURE — 77067 SCR MAMMO BI INCL CAD: CPT

## 2020-10-19 PROCEDURE — 77080 DXA BONE DENSITY AXIAL: CPT

## 2020-10-20 RX ORDER — CARVEDILOL 25 MG/1
25 TABLET ORAL DAILY
Qty: 90 TABLET | Refills: 0 | Status: SHIPPED | OUTPATIENT
Start: 2020-10-20 | End: 2020-12-01 | Stop reason: SDUPTHER

## 2020-12-01 ENCOUNTER — OFFICE VISIT (OUTPATIENT)
Dept: CARDIOLOGY CLINIC | Age: 75
End: 2020-12-01
Payer: MEDICARE

## 2020-12-01 VITALS
DIASTOLIC BLOOD PRESSURE: 72 MMHG | SYSTOLIC BLOOD PRESSURE: 136 MMHG | WEIGHT: 182.6 LBS | HEART RATE: 65 BPM | HEIGHT: 61 IN | BODY MASS INDEX: 34.48 KG/M2

## 2020-12-01 PROBLEM — R09.89 BRUIT OF RIGHT CAROTID ARTERY: Status: ACTIVE | Noted: 2020-12-01

## 2020-12-01 PROCEDURE — 1090F PRES/ABSN URINE INCON ASSESS: CPT | Performed by: INTERNAL MEDICINE

## 2020-12-01 PROCEDURE — G8417 CALC BMI ABV UP PARAM F/U: HCPCS | Performed by: INTERNAL MEDICINE

## 2020-12-01 PROCEDURE — G8399 PT W/DXA RESULTS DOCUMENT: HCPCS | Performed by: INTERNAL MEDICINE

## 2020-12-01 PROCEDURE — G8484 FLU IMMUNIZE NO ADMIN: HCPCS | Performed by: INTERNAL MEDICINE

## 2020-12-01 PROCEDURE — 99214 OFFICE O/P EST MOD 30 MIN: CPT | Performed by: INTERNAL MEDICINE

## 2020-12-01 PROCEDURE — 1123F ACP DISCUSS/DSCN MKR DOCD: CPT | Performed by: INTERNAL MEDICINE

## 2020-12-01 PROCEDURE — 4040F PNEUMOC VAC/ADMIN/RCVD: CPT | Performed by: INTERNAL MEDICINE

## 2020-12-01 PROCEDURE — 93000 ELECTROCARDIOGRAM COMPLETE: CPT | Performed by: INTERNAL MEDICINE

## 2020-12-01 PROCEDURE — G8427 DOCREV CUR MEDS BY ELIG CLIN: HCPCS | Performed by: INTERNAL MEDICINE

## 2020-12-01 PROCEDURE — 1036F TOBACCO NON-USER: CPT | Performed by: INTERNAL MEDICINE

## 2020-12-01 RX ORDER — CLOPIDOGREL BISULFATE 75 MG/1
75 TABLET ORAL DAILY
Qty: 90 TABLET | Refills: 3 | Status: CANCELLED | OUTPATIENT
Start: 2020-12-01

## 2020-12-01 RX ORDER — CARVEDILOL 25 MG/1
25 TABLET ORAL DAILY
Qty: 90 TABLET | Refills: 3 | Status: SHIPPED | OUTPATIENT
Start: 2020-12-01 | End: 2021-01-15

## 2020-12-01 RX ORDER — ATORVASTATIN CALCIUM 80 MG/1
80 TABLET, FILM COATED ORAL NIGHTLY
Qty: 90 TABLET | Refills: 3 | Status: SHIPPED | OUTPATIENT
Start: 2020-12-01 | End: 2021-02-23

## 2020-12-01 RX ORDER — CARVEDILOL 25 MG/1
25 TABLET ORAL DAILY
Qty: 90 TABLET | Refills: 0 | Status: CANCELLED | OUTPATIENT
Start: 2020-12-01

## 2020-12-01 RX ORDER — ATORVASTATIN CALCIUM 80 MG/1
80 TABLET, FILM COATED ORAL NIGHTLY
Qty: 90 TABLET | Refills: 3 | Status: CANCELLED | OUTPATIENT
Start: 2020-12-01

## 2020-12-01 RX ORDER — CHLORTHALIDONE 50 MG/1
50 TABLET ORAL DAILY
Qty: 90 TABLET | Refills: 2 | Status: CANCELLED | OUTPATIENT
Start: 2020-12-01

## 2020-12-01 RX ORDER — CHLORTHALIDONE 50 MG/1
50 TABLET ORAL DAILY
Qty: 90 TABLET | Refills: 2 | Status: ON HOLD | OUTPATIENT
Start: 2020-12-01 | End: 2020-12-14 | Stop reason: HOSPADM

## 2020-12-01 RX ORDER — CLOPIDOGREL BISULFATE 75 MG/1
75 TABLET ORAL DAILY
Qty: 90 TABLET | Refills: 3 | Status: SHIPPED | OUTPATIENT
Start: 2020-12-01 | End: 2021-03-16

## 2020-12-01 NOTE — LETTER
Dr. Eusebio Reece  1945  [id number]    Have you had any Chest Pain that is not new? - No      Have you had any Shortness of Breath - No      Have you had any dizziness - No    Have you had any palpitations that are not new?  - No      Do you have any edema - swelling in ankles    IHow long have they been having edema - Years  If Yes - Have they worn compression stockings Yes    Do you have a surgery or procedure scheduled in the near future - No

## 2020-12-01 NOTE — PROGRESS NOTES
1 tablet by mouth daily        No current facility-administered medications for this visit. Allergies:   Pcn [penicillins]    Patient History:  Past Medical History:   Diagnosis Date    Arthritis     CAD (coronary artery disease) 4/22/2018    Chronic kidney disease, stage III (moderate) 8/29/2017    H/O Doppler lower venous ultrasound 03/27/2019    No DVT or SVT, Significant reflux in RGSV and LGSV    H/O echocardiogram 06/11/2018    Limited study to evaluate LV function. Left ventricular size is normal Lv function is normal EF 50-55% EF has improved since last echo.  Hypertension      History reviewed. No pertinent surgical history. Family History   Problem Relation Age of Onset   [facility] Cancer Mother     Diabetes Mother     Coronary Art Dis Father     Heart Attack Father     Heart Disease Father     Coronary Art Dis Sister     Heart Attack Sister     Heart Disease Sister     Stroke Sister     High Blood Pressure Brother     High Cholesterol Brother     Kidney Disease Brother      Social History     Tobacco Use    Smoking status: Never Smoker    Smokeless tobacco: Never Used   Substance Use Topics    Alcohol use: No     Comment: 1 cup of coffee daily        Review of Systems:   · Constitutional: No Fever or Weight Loss   · Eyes: No Decreased Vision  · ENT: No Headaches, Hearing Loss or Vertigo  · Cardiovascular: as per note above   · Respiratory: No cough or wheezing and as per note above.    · Gastrointestinal: No abdominal pain, appetite loss, blood in stools, constipation, diarrhea or heartburn  · Genitourinary: reports urgency and incontinence, trouble voiding, or hematuria  · Musculoskeletal:  None  · Integumentary: No rash or pruritis  · Neurological: No TIA or stroke symptoms  · Psychiatric: No anxiety or depression  · Endocrine: No malaise, fatigue or temperature intolerance  · Hematologic/Lymphatic: No bleeding problems, blood clots or swollen lymph nodes  · Allergic/Immunologic: No nasal congestion or hives    Objective:      Physical Exam:  /72   Pulse 65   Ht 5' 1\" (1.549 m)   Wt 182 lb 9.6 oz (82.8 kg)   BMI 34.50 kg/m²   Wt Readings from Last 3 Encounters:   12/01/20 182 lb 9.6 oz (82.8 kg)   06/02/20 189 lb (85.7 kg)   03/03/20 184 lb 3.2 oz (83.6 kg)     Body mass index is 34.5 kg/m². Vitals:    12/01/20 0849   BP: 136/72   Pulse: 65    General Appearance:  No distress, conversant  Constitutional:  Well developed, Well nourished, No acute distress, Non-toxic appearance. HENT:  Normocephalic, Atraumatic, Bilateral external ears normal, Oropharynx moist, No oral exudates,right carotid bruit Nose normal. Neck- Normal range of motion, No tenderness, Supple, No stridor,no apical-carotid delay  Eyes:  PERRL, EOMI, Conjunctiva normal, No discharge. Respiratory:  Normal breath sounds, No respiratory distress, No wheezing, No chest tenderness. ,no use of accessory muscles, NO crackles  Cardiovascular: (PMI) apex non displaced,no lifts no thrills,S1 and S2 audible, systolic 2/6 murmur, No signs of ankle edema, or volume overload, No evidence of JVD, No crackles  GI:  Bowel sounds normal, Soft, No tenderness, No masses, No gross visceromegaly   :  No costovertebral angle tenderness   Musculoskeletal:  No edema, no tenderness, no deformities.  Back- no tenderness  Integument:  Well hydrated, no rash   Lymphatic:  No lymphadenopathy noted   Neurologic:  Alert & oriented x 3, CN 2-12 normal, normal motor function, normal sensory function, no focal deficits noted   Psychiatric:  Speech and behavior appropriate           Medical decision making and Data review:  DATA:  Lab Results   Component Value Date    TROPONINT 1.340 ([facility]) 04/21/2018     BNP:    Lab Results   Component Value Date    PROBNP 10,146 (H) 04/21/2018     PT/INR:  No results found for: PTINR  No results found for: LABA1C  Lab Results   Component Value Date    CHOL 135 06/08/2020    TRIG 63 06/08/2020    HDL 74 (A) 06/08/2020    LDLCALC 48 06/08/2020     Lab Results   Component Value Date    ALT 21 06/08/2020    AST 27 06/08/2020     TSH:   Lab Results   Component Value Date    TSH 0.804 08/30/2017     Cath 4/22/18   Procedure Summary   Radial Access   S/p PCI for 99 % mid and 80 % proximal LAD lesion using   overlapping BART 3.25 X38 inflated to 14 atms and 3.5 X 12   inflated 12 bam   Circ is dominant   RCA is non dominant   EF is > 55%   LMCA: short almost immediately bifurcates into Circ and LAD    LAD: 99 % mid lesion , there is diffuse 70 % to 80 % proximal hazy lesion just  before diagonal take off     LCx: dominant large vessel , gives large OM widely patent  RCA: non dominant small vessel   echo 4/23/18   Summary   Left ventricular function is abnormal,septal and apical wall akinesis   estimated EF is 35 to 40 %   Mild concentric left ventricular hypertrophy.   Grade III diastolic dysfunction.   Mildly dilated left atrium.   Right ventricular systolic pressure of 31 mm Hg.   Mild mitral regurgitation is present.   Mild tricuspid regurgitation.   No evidence of any pericardial effusion.    Echo 6/11/18   Summary   Limited Study to evaluate LV function   Left Ventricular size is Normal .   LV function is normal , EF 50-55%.   EF has improved since last echo in April 2018   No regional wall motion abnormality.  Juline File dilated left atrium.   No evidence of pericardial effusion.     Vein doppler 3/27/19  Summary        No evidence of DVT or SVT in the bilateral common femoral vein, femoral    vein, popliteal vein, greater saphenous vein or small saphenous vein.    Significant reflux noted of the Right GSV at the level of SFJ (2.4s).  Significant reflux noted in the Left CFV and the GSV at the level of the SFJ    (9.0s).         Recommendations        Advice thigh high pressure stockings, 20 to 30 mm of Hg.    Keep feet elevated.    Increase walking.        OV in 6 weeks             All labs, medications and tests reviewed by myself including data and history from outside source , patient and available family . Assessment & Plan:      1. Essential hypertension    2. ASCVD (arteriosclerotic cardiovascular disease)    3. Chronic diastolic congestive heart failure (HCC)    4. Stage 3b chronic kidney disease    5. Dyslipidemia    6. Bruit of right carotid artery         ASCVD (arteriosclerotic cardiovascular disease)  Status post overlapping stent to proximal LAD for 90% lesion/thrombus On 4/22/18. continue only plavix , no aspirin no angina doing well    Congestive heart failure ([address]  She appears euvolemic. She says her ankles swell  Repeat echo shows improved EF  50-55% After lad intervention . Continue  Toprol-XL. Continue losartan. She does not appear to retain water. Ankle swelling determined by examination  Doppler shows significant  Reflux, she feels better after socks, continue to  wear compression stockings she refused venous ablation    Carotid Bruit  Get ultrasound Doppler    Essential hypertension  Blood pressures well controlled at home according to her blood pressure log,continue nifedipine to 90 mg daily . continue coreg 25 mg bid, add chlorthalidone 50 mg , check lytes  Check labs in 1 week       Dyslipidemia :  Thien Barlow had lab work recently,  Lipid profile was reviewed with patient. continue high-dose statins , continue lipitor will check lipids , repeat labs with Dr Megan jason and medication compliance urged. We discussed that for the  prevention of ASCVD our  goal is aggressive risk modification. Patient is encouraged to exercise even a brisk walk for 30 minutes  at least 3 to 4 times a week   Various goals were discussed and questions answered. Continue current medications. Appropriate prescriptions are addressed and refills ordered. Questions answered and patient verbalizes understanding. Call for any problems, questions, or concerns.     Continue all other medications of all above medical condition listed as is. Return in about 6 months (around 6/1/2021). Please note this report has been partially produced using speech recognition software and may contain errors related to that system including errors in grammar, punctuation, and spelling, as well as words and phrases that may be inappropriate.  If there are any questions or concerns please feel free to contact the dictating provider for clarification.

## 2020-12-09 ENCOUNTER — APPOINTMENT (OUTPATIENT)
Dept: GENERAL RADIOLOGY | Age: 75
DRG: 388 | End: 2020-12-09
Payer: MEDICARE

## 2020-12-09 ENCOUNTER — APPOINTMENT (OUTPATIENT)
Dept: CT IMAGING | Age: 75
DRG: 388 | End: 2020-12-09
Payer: MEDICARE

## 2020-12-09 ENCOUNTER — HOSPITAL ENCOUNTER (INPATIENT)
Age: 75
LOS: 4 days | Discharge: HOME | DRG: 388 | End: 2020-12-14
Attending: EMERGENCY MEDICINE | Admitting: INTERNAL MEDICINE
Payer: MEDICARE

## 2020-12-09 DIAGNOSIS — R10.9 INTRACTABLE ABDOMINAL PAIN: Primary | ICD-10-CM

## 2020-12-09 DIAGNOSIS — R19.8 PERFORATED ABDOMINAL VISCUS: ICD-10-CM

## 2020-12-09 DIAGNOSIS — K56.7 ILEUS (HCC): ICD-10-CM

## 2020-12-09 DIAGNOSIS — R10.10 PAIN OF UPPER ABDOMEN: ICD-10-CM

## 2020-12-09 LAB
ALBUMIN SERPL-MCNC: 3.5 GM/DL (ref 3.4–5)
ALP BLD-CCNC: 63 IU/L (ref 40–129)
ALT SERPL-CCNC: 14 U/L (ref 10–40)
ANION GAP SERPL CALCULATED.3IONS-SCNC: 17 MMOL/L (ref 4–16)
AST SERPL-CCNC: 21 IU/L (ref 15–37)
BACTERIA: NEGATIVE /HPF
BASOPHILS ABSOLUTE: 0 K/CU MM
BASOPHILS RELATIVE PERCENT: 0.1 % (ref 0–1)
BILIRUB SERPL-MCNC: 1.2 MG/DL (ref 0–1)
BILIRUBIN URINE: ABNORMAL MG/DL
BLOOD, URINE: NEGATIVE
BUN BLDV-MCNC: 40 MG/DL (ref 6–23)
CALCIUM SERPL-MCNC: 9.2 MG/DL (ref 8.3–10.6)
CHLORIDE BLD-SCNC: 93 MMOL/L (ref 99–110)
CLARITY: ABNORMAL
CO2: 21 MMOL/L (ref 21–32)
COLOR: ABNORMAL
CREAT SERPL-MCNC: 3 MG/DL (ref 0.6–1.1)
DIFFERENTIAL TYPE: ABNORMAL
EOSINOPHILS ABSOLUTE: 0 K/CU MM
EOSINOPHILS RELATIVE PERCENT: 0 % (ref 0–3)
GFR AFRICAN AMERICAN: 18 ML/MIN/1.73M2
GFR NON-AFRICAN AMERICAN: 15 ML/MIN/1.73M2
GLUCOSE BLD-MCNC: 134 MG/DL (ref 70–99)
GLUCOSE, URINE: NEGATIVE MG/DL
HCT VFR BLD CALC: 33.7 % (ref 37–47)
HEMOGLOBIN: 11.4 GM/DL (ref 12.5–16)
HYALINE CASTS: 5 /LPF
ICTOTEST: NEGATIVE
IMMATURE NEUTROPHIL %: 0.4 % (ref 0–0.43)
KETONES, URINE: NEGATIVE MG/DL
LEUKOCYTE ESTERASE, URINE: ABNORMAL
LIPASE: 10 IU/L (ref 13–60)
LYMPHOCYTES ABSOLUTE: 0.6 K/CU MM
LYMPHOCYTES RELATIVE PERCENT: 5.7 % (ref 24–44)
MAGNESIUM: 2 MG/DL (ref 1.8–2.4)
MCH RBC QN AUTO: 32.1 PG (ref 27–31)
MCHC RBC AUTO-ENTMCNC: 33.8 % (ref 32–36)
MCV RBC AUTO: 94.9 FL (ref 78–100)
MONOCYTES ABSOLUTE: 0.5 K/CU MM
MONOCYTES RELATIVE PERCENT: 5.2 % (ref 0–4)
MUCUS: ABNORMAL HPF
NITRITE URINE, QUANTITATIVE: NEGATIVE
NUCLEATED RBC %: 0 %
PDW BLD-RTO: 14.2 % (ref 11.7–14.9)
PH, URINE: 5 (ref 5–8)
PLATELET # BLD: 191 K/CU MM (ref 140–440)
PMV BLD AUTO: 12.7 FL (ref 7.5–11.1)
POTASSIUM SERPL-SCNC: 3.5 MMOL/L (ref 3.5–5.1)
PROTEIN UA: 30 MG/DL
RBC # BLD: 3.55 M/CU MM (ref 4.2–5.4)
RBC URINE: ABNORMAL /HPF (ref 0–6)
SARS-COV-2, NAAT: NOT DETECTED
SEGMENTED NEUTROPHILS ABSOLUTE COUNT: 8.8 K/CU MM
SEGMENTED NEUTROPHILS RELATIVE PERCENT: 88.6 % (ref 36–66)
SODIUM BLD-SCNC: 131 MMOL/L (ref 135–145)
SOURCE: NORMAL
SPECIFIC GRAVITY UA: 1.02 (ref 1–1.03)
SQUAMOUS EPITHELIAL: 13 /HPF
TOTAL IMMATURE NEUTOROPHIL: 0.04 K/CU MM
TOTAL NUCLEATED RBC: 0 K/CU MM
TOTAL PROTEIN: 7.5 GM/DL (ref 6.4–8.2)
TRICHOMONAS: ABNORMAL /HPF
UROBILINOGEN, URINE: 1 MG/DL (ref 0.2–1)
WBC # BLD: 9.9 K/CU MM (ref 4–10.5)
WBC UA: 4 /HPF (ref 0–5)

## 2020-12-09 PROCEDURE — 99284 EMERGENCY DEPT VISIT MOD MDM: CPT

## 2020-12-09 PROCEDURE — 36415 COLL VENOUS BLD VENIPUNCTURE: CPT

## 2020-12-09 PROCEDURE — 2580000003 HC RX 258: Performed by: EMERGENCY MEDICINE

## 2020-12-09 PROCEDURE — 96366 THER/PROPH/DIAG IV INF ADDON: CPT

## 2020-12-09 PROCEDURE — 83735 ASSAY OF MAGNESIUM: CPT

## 2020-12-09 PROCEDURE — 96375 TX/PRO/DX INJ NEW DRUG ADDON: CPT

## 2020-12-09 PROCEDURE — 51798 US URINE CAPACITY MEASURE: CPT

## 2020-12-09 PROCEDURE — U0002 COVID-19 LAB TEST NON-CDC: HCPCS

## 2020-12-09 PROCEDURE — 85025 COMPLETE CBC W/AUTO DIFF WBC: CPT

## 2020-12-09 PROCEDURE — 80053 COMPREHEN METABOLIC PANEL: CPT

## 2020-12-09 PROCEDURE — 93005 ELECTROCARDIOGRAM TRACING: CPT | Performed by: EMERGENCY MEDICINE

## 2020-12-09 PROCEDURE — 96365 THER/PROPH/DIAG IV INF INIT: CPT

## 2020-12-09 PROCEDURE — 6360000002 HC RX W HCPCS: Performed by: EMERGENCY MEDICINE

## 2020-12-09 PROCEDURE — 2500000003 HC RX 250 WO HCPCS: Performed by: EMERGENCY MEDICINE

## 2020-12-09 PROCEDURE — 74176 CT ABD & PELVIS W/O CONTRAST: CPT

## 2020-12-09 PROCEDURE — 81001 URINALYSIS AUTO W/SCOPE: CPT

## 2020-12-09 PROCEDURE — 74018 RADEX ABDOMEN 1 VIEW: CPT

## 2020-12-09 PROCEDURE — 83690 ASSAY OF LIPASE: CPT

## 2020-12-09 RX ORDER — 0.9 % SODIUM CHLORIDE 0.9 %
1000 INTRAVENOUS SOLUTION INTRAVENOUS ONCE
Status: COMPLETED | OUTPATIENT
Start: 2020-12-09 | End: 2020-12-10

## 2020-12-09 RX ORDER — MORPHINE SULFATE 4 MG/ML
4 INJECTION, SOLUTION INTRAMUSCULAR; INTRAVENOUS ONCE
Status: COMPLETED | OUTPATIENT
Start: 2020-12-09 | End: 2020-12-09

## 2020-12-09 RX ORDER — 0.9 % SODIUM CHLORIDE 0.9 %
1000 INTRAVENOUS SOLUTION INTRAVENOUS ONCE
Status: COMPLETED | OUTPATIENT
Start: 2020-12-09 | End: 2020-12-09

## 2020-12-09 RX ORDER — SODIUM CHLORIDE 9 MG/ML
INJECTION, SOLUTION INTRAVENOUS CONTINUOUS
Status: DISCONTINUED | OUTPATIENT
Start: 2020-12-09 | End: 2020-12-10

## 2020-12-09 RX ORDER — LEVOFLOXACIN 5 MG/ML
750 INJECTION, SOLUTION INTRAVENOUS EVERY 24 HOURS
Status: DISCONTINUED | OUTPATIENT
Start: 2020-12-09 | End: 2020-12-10

## 2020-12-09 RX ADMIN — MORPHINE SULFATE 4 MG: 4 INJECTION, SOLUTION INTRAMUSCULAR; INTRAVENOUS at 22:14

## 2020-12-09 RX ADMIN — SODIUM CHLORIDE 1000 ML: 9 INJECTION, SOLUTION INTRAVENOUS at 22:09

## 2020-12-09 RX ADMIN — METRONIDAZOLE 500 MG: 500 INJECTION, SOLUTION INTRAVENOUS at 22:09

## 2020-12-09 RX ADMIN — SODIUM CHLORIDE 1000 ML: 9 INJECTION, SOLUTION INTRAVENOUS at 21:17

## 2020-12-09 ASSESSMENT — PAIN DESCRIPTION - PAIN TYPE: TYPE: ACUTE PAIN

## 2020-12-09 ASSESSMENT — PAIN SCALES - GENERAL: PAINLEVEL_OUTOF10: 10

## 2020-12-09 NOTE — ED PROVIDER NOTES
As physician-in-triage, I performed a telehealth medical screening history and exam on this patient. HISTORY OF PRESENT ILLNESS  Pierre Aldridge is a 66 y.o. female presents with diffuse abdominal pain and difficulty using the bathroom. Both constipation and difficulty urinating. She states she has had these issues for years but has been worse since Sunday. She denies any nausea, vomiting or fevers. She does not take any medications for the symptoms. PHYSICAL EXAM  There were no vitals taken for this visit. On exam, the patient appears in no acute distress. Speech is clear. Breathing is unlabored. Moves all extremities    Comment: Please note this report has been produced using speech recognition software and may contain errors related to that system including errors in grammar, punctuation, and spelling, as well as words and phrases that may be inappropriate. If there are any questions or concerns please feel free to contact the dictating provider for clarification.         Reno Bernstein MD  12/09/20 8138

## 2020-12-10 PROBLEM — R10.9 ABDOMINAL PAIN: Status: ACTIVE | Noted: 2020-12-10

## 2020-12-10 LAB
ALBUMIN SERPL-MCNC: 3.3 GM/DL (ref 3.4–5)
ALP BLD-CCNC: 59 IU/L (ref 40–128)
ALT SERPL-CCNC: 13 U/L (ref 10–40)
ANION GAP SERPL CALCULATED.3IONS-SCNC: 15 MMOL/L (ref 4–16)
AST SERPL-CCNC: 22 IU/L (ref 15–37)
BASOPHILS ABSOLUTE: 0 K/CU MM
BASOPHILS RELATIVE PERCENT: 0.1 % (ref 0–1)
BILIRUB SERPL-MCNC: 0.6 MG/DL (ref 0–1)
BUN BLDV-MCNC: 45 MG/DL (ref 6–23)
CALCIUM SERPL-MCNC: 8.4 MG/DL (ref 8.3–10.6)
CHLORIDE BLD-SCNC: 102 MMOL/L (ref 99–110)
CO2: 19 MMOL/L (ref 21–32)
CREAT SERPL-MCNC: 2.3 MG/DL (ref 0.6–1.1)
DIFFERENTIAL TYPE: ABNORMAL
EOSINOPHILS ABSOLUTE: 0 K/CU MM
EOSINOPHILS RELATIVE PERCENT: 0.1 % (ref 0–3)
GFR AFRICAN AMERICAN: 25 ML/MIN/1.73M2
GFR NON-AFRICAN AMERICAN: 21 ML/MIN/1.73M2
GLUCOSE BLD-MCNC: 86 MG/DL (ref 70–99)
HCT VFR BLD CALC: 32 % (ref 37–47)
HEMOGLOBIN: 10.6 GM/DL (ref 12.5–16)
IMMATURE NEUTROPHIL %: 0.4 % (ref 0–0.43)
LV EF: 58 %
LVEF MODALITY: NORMAL
LYMPHOCYTES ABSOLUTE: 0.5 K/CU MM
LYMPHOCYTES RELATIVE PERCENT: 6.8 % (ref 24–44)
MAGNESIUM: 1.9 MG/DL (ref 1.8–2.4)
MCH RBC QN AUTO: 31.5 PG (ref 27–31)
MCHC RBC AUTO-ENTMCNC: 33.1 % (ref 32–36)
MCV RBC AUTO: 95 FL (ref 78–100)
MONOCYTES ABSOLUTE: 0.6 K/CU MM
MONOCYTES RELATIVE PERCENT: 7.6 % (ref 0–4)
NUCLEATED RBC %: 0 %
PDW BLD-RTO: 14 % (ref 11.7–14.9)
PHOSPHORUS: 3.4 MG/DL (ref 2.5–4.9)
PLATELET # BLD: 185 K/CU MM (ref 140–440)
PMV BLD AUTO: 13 FL (ref 7.5–11.1)
POTASSIUM SERPL-SCNC: 3 MMOL/L (ref 3.5–5.1)
RBC # BLD: 3.37 M/CU MM (ref 4.2–5.4)
SEGMENTED NEUTROPHILS ABSOLUTE COUNT: 6.7 K/CU MM
SEGMENTED NEUTROPHILS RELATIVE PERCENT: 85 % (ref 36–66)
SODIUM BLD-SCNC: 136 MMOL/L (ref 135–145)
TOTAL IMMATURE NEUTOROPHIL: 0.03 K/CU MM
TOTAL NUCLEATED RBC: 0 K/CU MM
TOTAL PROTEIN: 6.2 GM/DL (ref 6.4–8.2)
WBC # BLD: 7.9 K/CU MM (ref 4–10.5)

## 2020-12-10 PROCEDURE — 96367 TX/PROPH/DG ADDL SEQ IV INF: CPT

## 2020-12-10 PROCEDURE — 2580000003 HC RX 258: Performed by: NURSE PRACTITIONER

## 2020-12-10 PROCEDURE — 99223 1ST HOSP IP/OBS HIGH 75: CPT | Performed by: INTERNAL MEDICINE

## 2020-12-10 PROCEDURE — 80048 BASIC METABOLIC PNL TOTAL CA: CPT

## 2020-12-10 PROCEDURE — 2500000003 HC RX 250 WO HCPCS: Performed by: INTERNAL MEDICINE

## 2020-12-10 PROCEDURE — 80053 COMPREHEN METABOLIC PANEL: CPT

## 2020-12-10 PROCEDURE — 84100 ASSAY OF PHOSPHORUS: CPT

## 2020-12-10 PROCEDURE — 93306 TTE W/DOPPLER COMPLETE: CPT

## 2020-12-10 PROCEDURE — 85025 COMPLETE CBC W/AUTO DIFF WBC: CPT

## 2020-12-10 PROCEDURE — 6360000002 HC RX W HCPCS: Performed by: EMERGENCY MEDICINE

## 2020-12-10 PROCEDURE — 2000000000 HC ICU R&B

## 2020-12-10 PROCEDURE — 2580000003 HC RX 258: Performed by: INTERNAL MEDICINE

## 2020-12-10 PROCEDURE — 94761 N-INVAS EAR/PLS OXIMETRY MLT: CPT

## 2020-12-10 PROCEDURE — 6360000002 HC RX W HCPCS: Performed by: FAMILY MEDICINE

## 2020-12-10 PROCEDURE — 93010 ELECTROCARDIOGRAM REPORT: CPT | Performed by: INTERNAL MEDICINE

## 2020-12-10 PROCEDURE — 82570 ASSAY OF URINE CREATININE: CPT

## 2020-12-10 PROCEDURE — 6360000002 HC RX W HCPCS: Performed by: INTERNAL MEDICINE

## 2020-12-10 PROCEDURE — 84156 ASSAY OF PROTEIN URINE: CPT

## 2020-12-10 PROCEDURE — 83735 ASSAY OF MAGNESIUM: CPT

## 2020-12-10 PROCEDURE — 99291 CRITICAL CARE FIRST HOUR: CPT | Performed by: SURGERY

## 2020-12-10 RX ORDER — ONDANSETRON 2 MG/ML
4 INJECTION INTRAMUSCULAR; INTRAVENOUS EVERY 6 HOURS PRN
Status: DISCONTINUED | OUTPATIENT
Start: 2020-12-10 | End: 2020-12-14 | Stop reason: HOSPADM

## 2020-12-10 RX ORDER — ACETAMINOPHEN 650 MG/1
650 SUPPOSITORY RECTAL EVERY 6 HOURS PRN
Status: DISCONTINUED | OUTPATIENT
Start: 2020-12-10 | End: 2020-12-14 | Stop reason: HOSPADM

## 2020-12-10 RX ORDER — ACETAMINOPHEN 325 MG/1
650 TABLET ORAL EVERY 6 HOURS PRN
Status: DISCONTINUED | OUTPATIENT
Start: 2020-12-10 | End: 2020-12-14 | Stop reason: HOSPADM

## 2020-12-10 RX ORDER — POTASSIUM CHLORIDE AND SODIUM CHLORIDE 900; 300 MG/100ML; MG/100ML
INJECTION, SOLUTION INTRAVENOUS CONTINUOUS
Status: DISCONTINUED | OUTPATIENT
Start: 2020-12-10 | End: 2020-12-12

## 2020-12-10 RX ORDER — SODIUM CHLORIDE 0.9 % (FLUSH) 0.9 %
10 SYRINGE (ML) INJECTION PRN
Status: DISCONTINUED | OUTPATIENT
Start: 2020-12-10 | End: 2020-12-14 | Stop reason: HOSPADM

## 2020-12-10 RX ORDER — PROMETHAZINE HYDROCHLORIDE 25 MG/1
12.5 TABLET ORAL EVERY 6 HOURS PRN
Status: DISCONTINUED | OUTPATIENT
Start: 2020-12-10 | End: 2020-12-14 | Stop reason: HOSPADM

## 2020-12-10 RX ORDER — SODIUM CHLORIDE 0.9 % (FLUSH) 0.9 %
10 SYRINGE (ML) INJECTION EVERY 12 HOURS SCHEDULED
Status: DISCONTINUED | OUTPATIENT
Start: 2020-12-10 | End: 2020-12-14 | Stop reason: HOSPADM

## 2020-12-10 RX ORDER — 0.9 % SODIUM CHLORIDE 0.9 %
1000 INTRAVENOUS SOLUTION INTRAVENOUS ONCE
Status: COMPLETED | OUTPATIENT
Start: 2020-12-10 | End: 2020-12-10

## 2020-12-10 RX ORDER — LEVOFLOXACIN 5 MG/ML
750 INJECTION, SOLUTION INTRAVENOUS
Status: DISCONTINUED | OUTPATIENT
Start: 2020-12-11 | End: 2020-12-14 | Stop reason: HOSPADM

## 2020-12-10 RX ORDER — MORPHINE SULFATE 4 MG/ML
2 INJECTION, SOLUTION INTRAMUSCULAR; INTRAVENOUS
Status: DISCONTINUED | OUTPATIENT
Start: 2020-12-10 | End: 2020-12-13

## 2020-12-10 RX ORDER — POTASSIUM CHLORIDE 7.45 MG/ML
10 INJECTION INTRAVENOUS
Status: COMPLETED | OUTPATIENT
Start: 2020-12-10 | End: 2020-12-10

## 2020-12-10 RX ORDER — ASPIRIN 81 MG/1
81 TABLET, CHEWABLE ORAL DAILY
Status: DISCONTINUED | OUTPATIENT
Start: 2020-12-10 | End: 2020-12-14 | Stop reason: HOSPADM

## 2020-12-10 RX ADMIN — SODIUM CHLORIDE: 9 INJECTION, SOLUTION INTRAVENOUS at 00:16

## 2020-12-10 RX ADMIN — POTASSIUM CHLORIDE 10 MEQ: 7.46 INJECTION, SOLUTION INTRAVENOUS at 16:15

## 2020-12-10 RX ADMIN — SODIUM CHLORIDE 1000 ML: 9 INJECTION, SOLUTION INTRAVENOUS at 04:23

## 2020-12-10 RX ADMIN — LEVOFLOXACIN 750 MG: 5 INJECTION, SOLUTION INTRAVENOUS at 00:07

## 2020-12-10 RX ADMIN — SODIUM CHLORIDE: 9 INJECTION, SOLUTION INTRAVENOUS at 14:11

## 2020-12-10 RX ADMIN — POTASSIUM CHLORIDE AND SODIUM CHLORIDE: 900; 300 INJECTION, SOLUTION INTRAVENOUS at 19:41

## 2020-12-10 RX ADMIN — ONDANSETRON 4 MG: 2 INJECTION INTRAMUSCULAR; INTRAVENOUS at 21:23

## 2020-12-10 RX ADMIN — METRONIDAZOLE 500 MG: 500 INJECTION, SOLUTION INTRAVENOUS at 09:25

## 2020-12-10 RX ADMIN — METRONIDAZOLE 500 MG: 500 INJECTION, SOLUTION INTRAVENOUS at 16:44

## 2020-12-10 RX ADMIN — POTASSIUM CHLORIDE 10 MEQ: 7.46 INJECTION, SOLUTION INTRAVENOUS at 15:11

## 2020-12-10 RX ADMIN — SODIUM CHLORIDE, PRESERVATIVE FREE 10 ML: 5 INJECTION INTRAVENOUS at 09:25

## 2020-12-10 RX ADMIN — METRONIDAZOLE 500 MG: 500 INJECTION, SOLUTION INTRAVENOUS at 01:41

## 2020-12-10 ASSESSMENT — PAIN SCALES - GENERAL
PAINLEVEL_OUTOF10: 0

## 2020-12-10 ASSESSMENT — ENCOUNTER SYMPTOMS
ANAL BLEEDING: 0
STRIDOR: 0
EYE ITCHING: 0
BACK PAIN: 0
COLOR CHANGE: 0
PHOTOPHOBIA: 0
RECTAL PAIN: 0
SORE THROAT: 0
EYE REDNESS: 0
ABDOMINAL PAIN: 1
APNEA: 0
CHOKING: 0
CONSTIPATION: 0

## 2020-12-10 NOTE — CARE COORDINATION
Attempted to meet with patient; she is asleep at this time. Will revisit. Belgica Sanchez RN    9610 Chart reviewed. Patient is from home; appears independent prior to admission. She has a PCP and insurance that assists with Rx when needed. No needs identified at this time. CM will remain available should needs arise.  Belgica Sanchez RN

## 2020-12-10 NOTE — PLAN OF CARE
Problem: Skin Integrity:  Goal: Will show no infection signs and symptoms  Description: Will show no infection signs and symptoms  Outcome: Ongoing  Goal: Absence of new skin breakdown  Description: Absence of new skin breakdown  Outcome: Ongoing     Problem: Discharge Planning:  Goal: Participates in care planning  Description: Participates in care planning  Outcome: Ongoing  Goal: Discharged to appropriate level of care  Description: Discharged to appropriate level of care  Outcome: Ongoing     Problem: Anxiety/Stress:  Goal: Level of anxiety will decrease  Description: Level of anxiety will decrease  Outcome: Ongoing     Problem: Bowel Function - Altered:  Goal: Bowel elimination is within specified parameters  Description: Bowel elimination is within specified parameters  Outcome: Ongoing     Problem: Fluid Volume - Imbalance:  Goal: Absence of imbalanced fluid volume signs and symptoms  Description: Absence of imbalanced fluid volume signs and symptoms  Outcome: Ongoing     Problem: Nutrition Deficit:  Goal: Ability to achieve adequate nutritional intake will improve  Description: Ability to achieve adequate nutritional intake will improve  Outcome: Ongoing     Problem: Pain:  Description: Pain management should include both nonpharmacologic and pharmacologic interventions.   Goal: Pain level will decrease  Description: Pain level will decrease  Outcome: Ongoing  Goal: Recognizes and communicates pain  Description: Recognizes and communicates pain  Outcome: Ongoing  Goal: Control of acute pain  Description: Control of acute pain  Outcome: Ongoing  Goal: Control of chronic pain  Description: Control of chronic pain  Outcome: Ongoing     Problem: Falls - Risk of:  Goal: Will remain free from falls  Description: Will remain free from falls  Outcome: Ongoing  Goal: Absence of physical injury  Description: Absence of physical injury  Outcome: Ongoing

## 2020-12-10 NOTE — FLOWSHEET NOTE
Pt arrived to unit at 0117. She ambulated from the ED cart to the ICU bed and was hooked up to the monitors. Vital signs taken (see flowsheets). 2 RN skin check was completed with Karthikeyan Farris. Pt has no skin issues. IV in the R FA with MIVF and intermittent antibiotics running. Flagyl was not given in ED, will give at this time. With the patient came a jacket, skirt, blouse and undergarments, along with sandals and a purse. Purse contains cellphone, wallet and other misc items. Pt declined putting anything in the safe at this time. Pt denied any pain when assessing her abdomen. Hospitalist was notified to put in zavala catheter. Zavala placed. See assessment.

## 2020-12-10 NOTE — PROGRESS NOTES
Dr. Whitley Beltran is currently on the unit making rounds and he was in to see Pt. He stated he is not going to take Pt to the OR today and he would like for her to remain NPO today. Echo tech is currently at the bedside doing an echo on Pt.

## 2020-12-10 NOTE — PROGRESS NOTES
Hospitalist Progress Note      Name:  Devan Hill /Age/Sex: 1945  (66 y.o. female)   MRN & CSN:  [id number] & [id number] Admission Date/Time: 2020  8:21 PM   Location:  -A PCP: Kyle Goncalves MD       Devan Hill is a 66 y.o.  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  - avoid nephrotoxic medication  - IVF  - monitor I/Os  - nephrology consulted    COVID 19 neg     H/o Chronic Diastolic CHF   CAD  Hypertension  hyperlipidemia  Anemia  Rheumatoid arthritis            DVT ppx: SCDs  Code status: Full code            Diet Diet NPO Effective Now   Code Status Full Code     Medications:   Medications:    metroNIDAZOLE  500 mg Intravenous Q8H    sodium chloride flush  10 mL Intravenous 2 times per day    levofloxacin  750 mg Intravenous Q24H      Infusions:    sodium chloride 100 mL/hr at 12/10/20 0124     PRN Meds: morphine, 2 mg, Q3H PRN  sodium chloride flush, 10 mL, PRN  acetaminophen, 650 mg, Q6H PRN    Or  acetaminophen, 650 mg, Q6H PRN  magnesium hydroxide, 30 mL, Daily PRN  promethazine, 12.5 mg, Q6H PRN    Or  ondansetron, 4 mg, Q6H PRN      Subjective:   Doing ok, no severe abd pain, feels slightly better this am    Objective:   No intake or output data in the 24 hours ending 12/10/20 1012   Vitals:   Vitals:    12/10/20 0900   BP: (!) 119/46   Pulse: 80   Resp: 21   Temp:    SpO2:      Physical Exam:   Gen:  awake, alert, no apparent distress  Head/Eyes:  Normocephalic atraumatic, EOMI   NECK:   symmetrical, trachea midline  LUNGS: Normal Effort   CARDIOVASCULAR:  Normal rate  ABDOMEN:  non distended  MUSCULOSKELETAL:  ROM l  NEUROLOGIC: Alert and Oriented,  Cranial nerves II-XII are grossly intact.    SKIN:  no bruising or bleeding, normal skin color,  no redness      Data:       CBC   Recent Labs     12/09/20  1611   WBC 9.9   HGB 11.4*   HCT 33.7*         BMP   Recent Labs     12/09/20  1611   *   K 3.5   CL 93*   CO2 21   BUN 40*   CREATININE 3.0*         Electronically signed by Aaron Cat MD on 12/10/2020 at 10:12 AM

## 2020-12-10 NOTE — H&P
Stroke Sister     High Blood Pressure Brother     High Cholesterol Brother     Kidney Disease Brother      Family Hx of HTN  Family Hx as reviewed above, otherwise non-contributory  Social History     Socioeconomic History    Marital status: Single     Spouse name: None    Number of children: None    Years of education: None    Highest education level: None   Occupational History    None   Social Needs    Financial resource strain: None    Food insecurity     Worry: None     Inability: None    Transportation needs     Medical: None     Non-medical: None   Tobacco Use    Smoking status: Never Smoker    Smokeless tobacco: Never Used   Substance and Sexual Activity    Alcohol use: No     Comment: 1 cup of coffee daily    Drug use: No    Sexual activity: None   Lifestyle    Physical activity     Days per week: None     Minutes per session: None    Stress: None   Relationships    Social connections     Talks on phone: None     Gets together: None     Attends Congregation service: None     Active member of club or organization: None     Attends meetings of clubs or organizations: None     Relationship status: None    Intimate partner violence     Fear of current or ex partner: None     Emotionally abused: None     Physically abused: None     Forced sexual activity: None   Other Topics Concern    None   Social History Narrative    None       MEDICATIONS   Medications Prior to Admission  Not in a hospital admission.     Current Medications  Current Facility-Administered Medications   Medication Dose Route Frequency Provider Last Rate Last Dose    0.9 % sodium chloride bolus  1,000 mL Intravenous Once Phani Joe, DO 1,000 mL/hr at 12/09/20 2209 1,000 mL at 12/09/20 2209    metronidazole (FLAGYL) 500 mg in NaCl 100 mL IVPB premix  500 mg Intravenous Once Phani Degroot,  mL/hr at 12/09/20 2209 500 mg at 12/09/20 2209    levoFLOXacin (LEVAQUIN) 750 MG/150ML infusion 750 mg  750 mg Intravenous Q24H Phani Saleem, DO         Current Outpatient Medications   Medication Sig Dispense Refill    atorvastatin (LIPITOR) 80 MG tablet Take 1 tablet by mouth nightly 90 tablet 3    carvedilol (COREG) 25 MG tablet Take 1 tablet by mouth daily 90 tablet 3    chlorthalidone (HYGROTEN) 50 MG tablet Take 1 tablet by mouth daily 90 tablet 2    clopidogrel (PLAVIX) 75 MG tablet Take 1 tablet by mouth daily 90 tablet 3    losartan (COZAAR) 100 MG tablet Take 1 tablet by mouth daily 90 tablet 3    Compression Stockings MISC by Does not apply route 15 to 20 mmHG 1 each 0    NIFEdipine (PROCARDIA XL) 90 MG extended release tablet Take 1 tablet by mouth daily (Patient taking differently: Take 60 mg by mouth daily ) 30 tablet 3    nitroGLYCERIN (NITROSTAT) 0.4 MG SL tablet up to max of 3 total doses. If no relief after 1 dose, call 911. 25 tablet 2    hydroxychloroquine (PLAQUENIL) 200 MG tablet Take 200 mg by mouth daily      calcium carbonate (CALCIUM ANTACID) 500 MG chewable tablet Take 1 tablet by mouth daily            Allergies  Allergies   Allergen Reactions    Pcn [Penicillins]        REVIEW OF SYSTEMS   Within above limitations. 14 point review of systems reviewed. Pertinent positive or negative as per HPI or otherwise negative per 14 point systems review. PHYSICAL EXAM     Wt Readings from Last 3 Encounters:   12/01/20 182 lb 9.6 oz (82.8 kg)   06/02/20 189 lb (85.7 kg)   03/03/20 184 lb 3.2 oz (83.6 kg)       Blood pressure 93/83, pulse 81, temperature 97.9 °F (36.6 °C), temperature source Oral, resp. rate 17, SpO2 99 %. General - AAO x 3  Psych - Appropriate affect/speech. No agitation  Eyes - Eye lids intact. No scleral icterus  ENT - Lips wnl. External ear clear/dry/intact. No thyromegaly on inspection  Neuro - No gross peripheral or central neuro deficits on inspection  Heart - Sinus. RRR. S1 and S2 present. No added HS/murmurs appreciated.  No elevated JVD appreciated  Lung - Adequate air entry b/l, No crackles/wheezes appreciated  GI - Soft. No guarding/rigidity. No hepatosplenomegaly/ascites. BS+   - No CVA/suprapubic tenderness or palpable bladder distension  Skin - Intact. No rash/petechiae/ecchymosis. Warm extremities  MSK - Joints with normal ROM.  No joint swellings    Lines/Drains/Airways/Wounds:  [unfilled]    LABS AND IMAGING   CBC  [unfilled]    Last 3 Hemoglobin  Lab Results   Component Value Date    HGB 11.4 12/09/2020    HGB 11.2 02/18/2020    HGB 12.1 01/11/2019     Last 3 WBC/ANC  Lab Results   Component Value Date    WBC 9.9 12/09/2020    WBC 3.9 02/18/2020    WBC 4.6 01/11/2019     No components found for: GRNLOCTYABS  Last 3 Platelets  No results found for: PLATELET  Chemistry  [unfilled]  [unfilled]  No results found for: LDH  Coagulation Studies  No results found for: PTT, INR  Liver Function Studies  Lab Results   Component Value Date    ALT 14 12/09/2020    AST 21 12/09/2020    ALKPHOS 63 12/09/2020       Recent Imaging        Relevant labs and imaging reviewed    ASSESSMENT AND PLAN   Gloria Schulz is a 66 y.o. female p/w    Abdominal pain likely secondary to perforated viscus  -Surgery consulted, states that admit to ICU, and monitor, will see patient in the morning, recommended n.p.o.  -Per ED provider patient does not have \"acute abdomen, \"  -Serial abdominal examination  -N.p.o. strict  -Gentle hydration  -Continue with Levaquin and Flagyl    H/o CHF and CAD  - 50-55% After lad intervention   - proximal LAD for 90% lesion/thrombus On 4/22/18  - on Plavix, held for possible Sx  - cardiology consulted    UYEN in setting in ckd 2/3, and dehydration  - avoid nephrotoxic medication  - IVF  - monitor I/Os  - nephrology consult by ED provider, agrees with IV fluids    Hypertension/hyperlipidemia  Anemia  Rheumatoid arthritis       Case d/w ED provider    DVT ppx: SCDs  Code status: Full code    [facility], Internal Medicine  12/9/2020 at 10:35 PM

## 2020-12-10 NOTE — CONSULTS
CARDIOLOGY CONSULT NOTE   Reason for consultation:  Abdominal pain/CAD    Referring physician:  Sergio Loya MD     Primary care physician: Ray Evans MD      Dear  Dr. Sergio Loya MD   Thanks for the consult. Chief Complaints :  Chief Complaint   Patient presents with    Abdominal Pain    Urinary Retention        History of present illness:Eun is a 66 y. o.year old who presents with primarily complaining of abdominal pain discomfort CT scan is concerning for possible perforated viscus she denies any chest pain. Marissa Avilez is known to me from outpatient service she had PCI to LAD in the setting of NSTEMI in 2018 she is on Plavix as outpatient her EF is normal EKG shows normal sinus rhythm. She also has acute renal failure says she has been having abdominal discomfort for several weeks to months now. Past medical history:    has a past medical history of Arthritis, CAD (coronary artery disease), Chronic kidney disease, stage III (moderate), H/O Doppler lower venous ultrasound, H/O echocardiogram, and Hypertension. Past surgical history:   has no past surgical history on file. Social History:   reports that she has never smoked. She has never used smokeless tobacco. She reports that she does not drink alcohol or use drugs.   Family history:   no family history of CAD, STROKE of DM at early age    Allergies   Allergen Reactions    Pcn [Penicillins]        metronidazole (FLAGYL) 500 mg in NaCl 100 mL IVPB premix, Q8H  morphine sulfate (PF) injection 2 mg, Q3H PRN  sodium chloride flush 0.9 % injection 10 mL, 2 times per day  sodium chloride flush 0.9 % injection 10 mL, PRN  acetaminophen (TYLENOL) tablet 650 mg, Q6H PRN    Or  acetaminophen (TYLENOL) suppository 650 mg, Q6H PRN  magnesium hydroxide (MILK OF MAGNESIA) 400 MG/5ML suspension 30 mL, Daily PRN  promethazine (PHENERGAN) tablet 12.5 mg, Q6H PRN    Or  ondansetron (ZOFRAN) injection 4 mg, Q6H PRN  aspirin chewable tablet 81 mg, Daily  levoFLOXacin (LEVAQUIN) 750 MG/150ML infusion 750 mg, Q24H  0.9 % sodium chloride infusion, Continuous      Current Facility-Administered Medications   Medication Dose Route Frequency Provider Last Rate Last Dose    metronidazole (FLAGYL) 500 mg in NaCl 100 mL IVPB premix  500 mg Intravenous Q8H Trista STROUD MD   Stopped at 12/10/20 1030    morphine sulfate (PF) injection 2 mg  2 mg Intravenous Q3H PRN Rony Concepcion MD        sodium chloride flush 0.9 % injection 10 mL  10 mL Intravenous 2 times per day Milka Zabala MD   10 mL at 12/10/20 0925    sodium chloride flush 0.9 % injection 10 mL  10 mL Intravenous PRN Rony Concepcion MD        acetaminophen (TYLENOL) tablet 650 mg  650 mg Oral Q6H PRN Rony Concepcion MD        Or    acetaminophen (TYLENOL) suppository 650 mg  650 mg Rectal Q6H PRN Rony Concepcion MD        magnesium hydroxide (MILK OF MAGNESIA) 400 MG/5ML suspension 30 mL  30 mL Oral Daily PRN Rony Concepcion MD        promethazine (PHENERGAN) tablet 12.5 mg  12.5 mg Oral Q6H PRN Rony Concepcion MD        Or    ondansetron (ZOFRAN) injection 4 mg  4 mg Intravenous Q6H PRN Rony Concepcion MD        aspirin chewable tablet 81 mg  81 mg Oral Daily Roya Bunch MD        levoFLOXacin (LEVAQUIN) 750 MG/150ML infusion 750 mg  750 mg Intravenous Q24H Phani Joe DO   Stopped at 12/10/20 0137    0.9 % sodium chloride infusion   Intravenous Continuous Jose Smith  mL/hr at 12/10/20 0124       Review of Systems:   · Constitutional: No Fever or Weight Loss   · Eyes: No Decreased Vision  · ENT: No Headaches, Hearing Loss or Vertigo  · Cardiovascular: As per HPI  · Respiratory: As per HPI  · Gastrointestinal: No abdominal pain, appetite loss, blood in stools, constipation, diarrhea or heartburn  · Genitourinary: No dysuria, trouble voiding, or hematuria  · Musculoskeletal:  No gait disturbance, weakness or joint complaints  · Integumentary: No rash or pruritis  · Neurological: No TIA or stroke symptoms  · Psychiatric: No anxiety or depression  · Endocrine: No malaise, fatigue or temperature intolerance  · Hematologic/Lymphatic: No bleeding problems, blood clots or swollen lymph nodes  · Allergic/Immunologic: No nasal congestion or hives  All systems negative except as marked. Physical Examination:    Vitals:    12/10/20 0800 12/10/20 0833 12/10/20 0900 12/10/20 1000   BP: (!) 99/42  (!) 119/46 (!) 118/47   Pulse: 71  80 81   Resp: 17 21 17   Temp: 97.9 °F (36.6 °C)      TempSrc: Oral      SpO2: 99% 98%  100%   Weight:       Height:           General Appearance:  No distress, conversant    Constitutional:  Well developed, Well nourished, No acute distress, Non-toxic appearance. HENT:  Normocephalic, Atraumatic, Bilateral external ears normal, Oropharynx moist, No oral exudates, Nose normal. Neck- Normal range of motion, No tenderness, Supple, No stridor,no apical-carotid delay  Lymphatics : no palpable lymph nodes  Eyes:  PERRL, EOMI, Conjunctiva normal, No discharge. Respiratory:  Normal breath sounds, No respiratory distress, No wheezing, No chest tenderness. ,no use of accessory muscles, crackles Absent   Cardiovascular: (PMI) apex non displaced,no lifts no thrills, ankle swelling Absent  , 1+, s1 and s2 audible,Murmur. Absent , JVD not noted    Abdomen /GI:  Bowel sounds normal, Soft, No tenderness, No masses, No gross visceromegaly   :  No costovertebral angle tenderness   Musculoskeletal:  No edema, no tenderness, no deformities.  Back- no tenderness  Integument:  Well hydrated, no rash   Lymphatic:  No lymphadenopathy noted   Neurologic:  Alert & oriented x 3, CN 2-12 normal, normal motor function, normal sensory function, no focal deficits noted           Medical decision making and Data review:    Lab Review   Recent Labs     12/09/20  1611   WBC 9.9   HGB 11.4*   HCT 33.7*         Recent Labs     12/09/20  1611   *   K 3.5   CL 93*   CO2 21   BUN 40*   CREATININE 3.0*     Recent Labs     12/09/20  1611   AST 21   ALT 14   BILITOT 1.2*   ALKPHOS 63     No results for input(s): TROPONINT in the last 72 hours. No results for input(s): PROBNP in the last 72 hours. No results found for: INR, PROTIME    EKG: (reviewed by myself)    ECHO:(reviewed by myself)    Chest Xray:(reviewed by myself)  Ct Abdomen Pelvis Wo Contrast Additional Contrast? None    Result Date: 12/9/2020  EXAMINATION: CT OF THE ABDOMEN AND PELVIS WITHOUT CONTRAST 12/9/2020 8:51 pm TECHNIQUE: CT of the abdomen and pelvis was performed without the administration of intravenous contrast. Multiplanar reformatted images are provided for review. Dose modulation, iterative reconstruction, and/or weight based adjustment of the mA/kV was utilized to reduce the radiation dose to as low as reasonably achievable. COMPARISON: KUB December 9, 2020; CT abdomen pelvis July 21, 2017 HISTORY: ORDERING SYSTEM PROVIDED HISTORY: SBO TECHNOLOGIST PROVIDED HISTORY: Reason for exam:->SBO Additional Contrast?->None Reason for Exam: Abdominal Pain; Urinary Retention Acuity: Acute Type of Exam: Initial Additional signs and symptoms: abd pain Relevant Medical/Surgical History: concern for sbo FINDINGS: Lower Chest: Lung bases are clear. Coronary artery atherosclerotic disease. Organs: Evaluation of the solid organs is limited due to lack of intravenous contrast.  The nonenhanced liver demonstrates no acute abnormality. Subcentimeter hypodensity of the left hepatic lobe is too small to accurately characterize. The gallbladder appears unremarkable. The nonenhanced spleen, pancreas, and bilateral adrenal glands demonstrate no acute findings. The kidneys enhance symmetrically. Simple cyst of the right kidney which is benign in appearance. No additional follow-up necessary.  GI/Bowel: Dilated fluid-filled loops of small bowel throughout the abdomen measuring up to 3.5 cm with no well-defined transition point identified. There does appear to be tapering of small bowel loops within the mid abdomen (images 79 through 100 and 11 series 2). There is partial collapse of the more distal small bowel. Colonic diverticulosis. Mild stranding noted throughout the pelvis limiting evaluation for diverticulitis, however, acute diverticulitis with perforation is favored given free air within the abdomen. Pelvis: The bladder is collapsed therefore not well evaluated. Calcifications within the uterus compatible with calcified fibroids. Peritoneum/Retroperitoneum: The abdominal aorta is normal in caliber with scattered atherosclerotic plaque present. No retroperitoneal adenopathy. Small amount of free air identified within the abdomen which is predominantly noted within the left upper quadrant. Findings compatible with perforated viscus. The exact origin of the free gas is indeterminate. Small amount of free fluid also present within the pelvis. No organized fluid collection identified. Bones/Soft Tissues: No acute osseous or soft tissue abnormality identified. 1. Small amount of free air present within the abdomen which is most consistent with perforated viscus. The exact origin of the free air is indeterminate, however, the patient does have diverticulosis with stranding noted throughout the pelvis and acute diverticulitis with perforation is favored. No well-defined organized fluid identified at this time. A small amount of free fluid is present within the pelvis. 2. Dilated fluid-filled loops of small bowel also present throughout the abdomen without well-defined transition point identified. There is tapering of the small bowel distally and findings favored to reflect ileus, however, partial small bowel obstruction cannot entirely be excluded. Recommend interval follow-up to ensure resolution. Critical results were called by Dr. Luciano Berrios MD to Dr. Prabhu sanchez [facility] on 12/9/2020 at 21:42.      Nitin Esparza Abdomen (kub) (single Ap View)    Result Date: 12/9/2020  EXAMINATION: ONE SUPINE XRAY VIEW(S) OF THE ABDOMEN 12/9/2020 4:29 pm COMPARISON: None. HISTORY: ORDERING SYSTEM PROVIDED HISTORY: constipation TECHNOLOGIST PROVIDED HISTORY: Reason for exam:->constipation Reason for Exam: constipation Acuity: Acute Type of Exam: Initial Additional signs and symptoms: pain Relevant Medical/Surgical History: none FINDINGS: Gas seen in dilated small bowel. No significant colonic gas. Calcifications centrally in the pelvis may be related to uterine fibroids. Gas seen in dilated small bowel centrally in the abdomen suggesting a small bowel obstruction       All labs, medications and tests reviewed by myself including data  from outside source , patient and available family . Continue all other medications of all above medical condition listed as is. Impression:  Active Problems:    ASCVD (arteriosclerotic cardiovascular disease)    Essential hypertension    Abdominal pain  Resolved Problems:    * No resolved hospital problems. *      Assessment: 66 y. o.year old with PMH of  has a past medical history of Arthritis, CAD (coronary artery disease), Chronic kidney disease, stage III (moderate), H/O Doppler lower venous ultrasound, H/O echocardiogram, and Hypertension. Plan and Recommendations:    History of coronary artery disease hold Plavix in anticipation of possible surgery continue aspirin 81 mg daily if possible. Proceed with surgery as needed no further cardiac testing needed at this time  Abdominal pain possible viscus perforation as per primary team and surgery  DVT prophylaxis if no contraindication  6. We will follow with you          Thank you  much for consult and giving us the opportunity in contributing in the care of this patient. Please feel free to call me for any questions.        Tony Cotton MD, 12/10/2020 11:28 AM

## 2020-12-10 NOTE — PROGRESS NOTES
RENAL DOSE ADJUSTMENT MADE PER P/T PROTOCOL    PREVIOUS ORDER:  Levaquin 750mg IV every 24 hours    Estimated Creatinine Clearance: 20 mL/min (A) (based on SCr of 2.3 mg/dL (H)). Recent Labs     12/09/20  1611 12/10/20  1338   BUN 40* 45*   CREATININE 3.0* 2.3*    185     NEW RENALLY ADJUSTED ORDER:  Levaquin 750mg IV every 48 hours.     MAHAD Morelos [facility]  12/10/2020 5:12 PM  __________________________________________________

## 2020-12-10 NOTE — ED PROVIDER NOTES
Emergency Department Encounter    Patient: Ingrid Chase  MRN: [id number]  : 1945  Date of Evaluation: 2020  ED Provider:  Odalis Santillan    Triage Chief Complaint:   Abdominal Pain and Urinary Retention      [place]:  Ingrid Chase is a 66 y.o. female that presents to the emergency department for evaluation of abdominal pain. Patient is unable to localize the pain, states it is throughout her abdomen. Pain started  and has progressively gotten worse. Patient notes that she has been battling constipation for \"a long time. \"  She has had normal bowel movements. She last had a bowel movement earlier today, she says it was a very small bowel movement. No blood or discharge. Patient has been passing flatus. She says that she has not had much of an appetite. She has not been eating or drinking. Denies fevers, chills, diaphoresis, night sweats. Denies abdominal trauma. Denies syncope, dizziness, lightheadedness. Denies chest pain or pleuritic pain. Denies additional precipitating, modifying, alleviating features. ROS - see HPI, below listed is current ROS at time of my eval:  A complete, 10 point review of systems was performed and is as dictated above, otherwise negative. Past Medical History:   Diagnosis Date    Arthritis     CAD (coronary artery disease) 2018    Chronic kidney disease, stage III (moderate) 2017    H/O Doppler lower venous ultrasound 2019    No DVT or SVT, Significant reflux in RGSV and LGSV    H/O echocardiogram 2018    Limited study to evaluate LV function. Left ventricular size is normal Lv function is normal EF 50-55% EF has improved since last echo.  Hypertension        History reviewed. No pertinent surgical history.     Family History   Problem Relation Age of Onset    Cancer Mother     Diabetes Mother     Coronary Art Dis Father     Heart Attack Father     Heart Disease Father     Coronary Art Dis Sister     Heart Attack Sister     Heart Disease Sister     Stroke Sister     High Blood Pressure Brother     High Cholesterol Brother     Kidney Disease Brother        Social History     Socioeconomic History    Marital status: Single     Spouse name: Not on file    Number of children: Not on file    Years of education: Not on file    Highest education level: Not on file   Occupational History    Not on file   Social Needs    Financial resource strain: Not on file    Food insecurity     Worry: Not on file     Inability: Not on file   [employer] needs     Medical: Not on file     Non-medical: Not on file   Tobacco Use    Smoking status: Never Smoker    Smokeless tobacco: Never Used   Substance and Sexual Activity    Alcohol use: No     Comment: 1 cup of coffee daily    Drug use: No    Sexual activity: Not on file   Lifestyle    Physical activity     Days per week: Not on file     Minutes per session: Not on file    Stress: Not on file   Relationships    Social connections     Talks on phone: Not on file     Gets together: Not on file     Attends Yazdanism service: Not on file     Active member of club or organization: Not on file     Attends meetings of clubs or organizations: Not on file     Relationship status: Not on file    Intimate partner violence     Fear of current or ex partner: Not on file     Emotionally abused: Not on file     Physically abused: Not on file     Forced sexual activity: Not on file   Other Topics Concern    Not on file   Social History Narrative    Not on file       Current Facility-Administered Medications   Medication Dose Route Frequency Provider Last Rate Last Dose    0.9 % sodium chloride bolus  1,000 mL Intravenous Once Phani S Joe, DO 1,000 mL/hr at 12/09/20 2209 1,000 mL at 12/09/20 2209    metronidazole (FLAGYL) 500 mg in NaCl 100 mL IVPB premix  500 mg Intravenous Once Phani S Joe,  mL/hr at 12/09/20 2209 500 mg at 12/09/20 2209    levoFLOXacin ([address] [place]) bruits. No subcutaneous emphysema. Heart:  Regular rate and sinus rhythm. Audible S1 & S2. No audible murmurs, rubs, or gallops. Perfusion:  Symmetric peripheral pulses. Brisk capillary refill. Respiratory:  Lungs clear to auscultation bilaterally. Respirations nonlabored. Abdominal:  Soft. Non-distended. Tenderness to palpation diffusely. No focal tenderness. No rebound, guarding, or rigidity. No signs of peritonitis. Neg Pascal's sign. Neg McBurney's point tenderness. Negative obturator and psoas signs. Negative Rovsing sign. No periumbilical or flank ecchymosis. Normal bowel sounds in all 4 quadrants. No midline pulsatile abdominal masses, thrills, bruits. Back:  No CVA TTP. Negative Eric punch test bilaterally. No tenderness to palpation at the midline in the cervical spine, thoracic spine, lumbar spine. No clinical signs of cauda equina syndrome. Extremity:  normal ROM in all extremities  Neurological:  Alert and oriented times 3. No focal or lateralizing neurological deficits  Psychiatric: Cooperative.      I have reviewed and interpreted all of the available laboratory and radiographic data from this visit:    Labs:  Results for orders placed or performed during the hospital encounter of 12/09/20   CBC Auto Differential   Result Value Ref Range    WBC 9.9 4.0 - 10.5 K/CU MM    RBC 3.55 (L) 4.2 - 5.4 M/CU MM    Hemoglobin 11.4 (L) 12.5 - 16.0 GM/DL    Hematocrit 33.7 (L) 37 - 47 %    MCV 94.9 78 - 100 FL    MCH 32.1 (H) 27 - 31 PG    MCHC 33.8 32.0 - 36.0 %    RDW 14.2 11.7 - 14.9 %    Platelets 396 869 - 092 K/CU MM    MPV 12.7 (H) 7.5 - 11.1 FL    Differential Type AUTOMATED DIFFERENTIAL     Segs Relative 88.6 (H) 36 - 66 %    Lymphocytes % 5.7 (L) 24 - 44 %    Monocytes % 5.2 (H) 0 - 4 %    Eosinophils % 0.0 0 - 3 %    Basophils % 0.1 0 - 1 %    Segs Absolute 8.8 K/CU MM    Lymphocytes Absolute 0.6 K/CU MM    Monocytes Absolute 0.5 K/CU MM    Eosinophils Absolute 0.0 K/CU MM    Basophils Absolute 0.0 K/CU MM    Nucleated RBC % 0.0 %    Total Nucleated RBC 0.0 K/CU MM    Total Immature Neutrophil 0.04 K/CU MM    Immature Neutrophil % 0.4 0 - 0.43 %   Comprehensive Metabolic Panel w/ Reflex to MG   Result Value Ref Range    Sodium 131 (L) 135 - 145 MMOL/L    Potassium 3.5 3.5 - 5.1 MMOL/L    Chloride 93 (L) 99 - 110 mMol/L    CO2 21 21 - 32 MMOL/L    BUN 40 (H) 6 - 23 MG/DL    CREATININE 3.0 (H) 0.6 - 1.1 MG/DL    Glucose 134 (H) 70 - 99 MG/DL    Calcium 9.2 8.3 - 10.6 MG/DL    Alb 3.5 3.4 - 5.0 GM/DL    Total Protein 7.5 6.4 - 8.2 GM/DL    Total Bilirubin 1.2 (H) 0.0 - 1.0 MG/DL    ALT 14 10 - 40 U/L    AST 21 15 - 37 IU/L    Alkaline Phosphatase 63 40 - 129 IU/L    GFR Non-African American 15 (L) >60 mL/min/1.73m2    GFR  18 (L) >60 mL/min/1.73m2    Anion Gap 17 (H) 4 - 16   Lipase   Result Value Ref Range    Lipase 10 (L) 13 - 60 IU/L   Urinalysis, reflex to microscopic   Result Value Ref Range    Color, UA JOVANNY (A) YELLOW    Clarity, UA SLIGHTLY CLOUDY (A) CLEAR    Glucose, Urine NEGATIVE NEGATIVE MG/DL    Bilirubin Urine SMALL (A) NEGATIVE MG/DL    Ketones, Urine NEGATIVE NEGATIVE MG/DL    Specific Gravity, UA 1.019 1.001 - 1.035    Blood, Urine NEGATIVE NEGATIVE    pH, Urine 5.0 5.0 - 8.0    Protein, UA 30 (A) NEGATIVE MG/DL    Urobilinogen, Urine 1 0.2 - 1.0 MG/DL    Nitrite Urine, Quantitative NEGATIVE NEGATIVE    Leukocyte Esterase, Urine MODERATE (A) NEGATIVE    RBC, UA NONE SEEN 0 - 6 /HPF    WBC, UA 4 0 - 5 /HPF    Bacteria, UA NEGATIVE NEGATIVE /HPF    Squam Epithel, UA 13 /HPF    Mucus, UA RARE (A) NEGATIVE HPF    Trichomonas, UA NONE SEEN NONE SEEN /HPF    Hyaline Casts, UA 5 /LPF   Magnesium   Result Value Ref Range    Magnesium 2.0 1.8 - 2.4 mg/dl   ICTOTEST, URINE   Result Value Ref Range    Ictotest NEGATIVE    EKG 12 Lead   Result Value Ref Range    Ventricular Rate 74 BPM    Atrial Rate 74 BPM    P-R Interval 174 ms    QRS Duration 88 ms    Q-T Interval 380 ms loops within the mid abdomen (images 79 through 100 and 11 series 2). There is partial collapse of the more distal small bowel. Colonic diverticulosis. Mild stranding noted throughout the pelvis limiting evaluation for diverticulitis, however, acute diverticulitis with perforation is favored given free air within the abdomen. Pelvis: The bladder is collapsed therefore not well evaluated. Calcifications within the uterus compatible with calcified fibroids. Peritoneum/Retroperitoneum: The abdominal aorta is normal in caliber with scattered atherosclerotic plaque present. No retroperitoneal adenopathy. Small amount of free air identified within the abdomen which is predominantly noted within the left upper quadrant. Findings compatible with perforated viscus. The exact origin of the free gas is indeterminate. Small amount of free fluid also present within the pelvis. No organized fluid collection identified. Bones/Soft Tissues: No acute osseous or soft tissue abnormality identified. 1. Small amount of free air present within the abdomen which is most consistent with perforated viscus. The exact origin of the free air is indeterminate, however, the patient does have diverticulosis with stranding noted throughout the pelvis and acute diverticulitis with perforation is favored. No well-defined organized fluid identified at this time. A small amount of free fluid is present within the pelvis. 2. Dilated fluid-filled loops of small bowel also present throughout the abdomen without well-defined transition point identified. There is tapering of the small bowel distally and findings favored to reflect ileus, however, partial small bowel obstruction cannot entirely be excluded. Recommend interval follow-up to ensure resolution. Critical results were called by Dr. Michael MD to Dr. Bang Osman s [facility] on 12/9/2020 at 21:42.      Xr Abdomen (kub) (single Ap View)    Result Date: 12/9/2020  EXAMINATION: ONE findings on x-ray, CT is ordered. Unfortunately, CT without contrast had to be performed secondary to acute kidney injury with decreased GFR. CT is reviewed by myself. Radiology report reads small amount of free air present within the abdomen which is most consistent with perforated viscus. The exact origin of the free air is indeterminate, however, the patient does have diverticulosis with stranding noted throughout the pelvis and acute diverticulitis with perforation is favored. No well-defined organized fluid identified at this time. A small amount of free fluid is present within the pelvis. Dilated fluid-filled loops of small bowel also present throughout the abdomen without well-defined transition point identified. There is tapering of the small bowel distally and findings favored to reflect ileus, however, partial small bowel obstruction cannot entirely be excluded. On repeat evaluations, patient remains hemodynamically stable. 4 mg intravenous morphine is provided for pain. Given findings on CT scan, general surgery on-call Dr. Whitley Beltran is immediately paged. Case discussed at 2215. Recommends no immediate surgical intervention. Is agreeable with resuscitation with IV fluids and IV antibiotics. Recommends keeping the patient n.p.o. and obtaining rapid COVID-19 testing. As patient is not currently nauseous or vomiting, no recommendation for NG/OG tube. Requests ICU admission. Case is discussed with nephrologist Dr. Dory Cotton who is agreeable with IV fluid hydration. No recommendations for acute dialysis. Will evaluate the patient in the morning. Results of laboratory and radiographic data along with differential diagnoses and treatment plan were discussed with the patient. Patient presents with abdominal pain. Symptoms concerning for acute, intractable abdominal pain secondary to perforated abdominal viscus. Patient has no signs of severe sepsis or septic shock.   Patient has allergy to penicillins. Levaquin and Flagyl are initiated. Given his symptoms, we recommend admission. Patient is agreeable. Case is discussed with admitting hospitalist who is agreeable with treatment plan and accepts admission. Blood pressure is currently stable at 109/51, pulse is 86. Pain is well controlled. No active nausea or vomiting. Clinical Impression:  1. Intractable abdominal pain    2. Perforated abdominal viscus    3. Ileus [facility])        Critical Care Note  Total critical care time provided today was 32 minutes. This excludes seperately billable procedures and family discussion time. Critical care time provided for obtaining history, conducting a physical exam, performing and monitoring interventions, ordering, collecting and interpreting tests, and establishing medical decision-making. There was a potential for life/limb threatening pathology requiring close evaluation and intervention with concern for patient decompensation. ED Provider Disposition:  DISPOSITION Decision To Admit 12/09/2020 09:49:00 PM      Comment: Please note this report has been produced using speech recognition software and may contain errors related to that system including errors in grammar, punctuation, and spelling, as well as words and phrases that may be inappropriate. Efforts were made to edit the dictations.        Keyon Young DO  12/09/20 5912

## 2020-12-11 LAB
ANION GAP SERPL CALCULATED.3IONS-SCNC: 13 MMOL/L (ref 4–16)
BASOPHILS ABSOLUTE: 0 K/CU MM
BASOPHILS RELATIVE PERCENT: 0.1 % (ref 0–1)
BUN BLDV-MCNC: 28 MG/DL (ref 6–23)
CALCIUM SERPL-MCNC: 9 MG/DL (ref 8.3–10.6)
CHLORIDE BLD-SCNC: 104 MMOL/L (ref 99–110)
CO2: 22 MMOL/L (ref 21–32)
CREAT SERPL-MCNC: 1.2 MG/DL (ref 0.6–1.1)
DIFFERENTIAL TYPE: ABNORMAL
EOSINOPHILS ABSOLUTE: 0 K/CU MM
EOSINOPHILS RELATIVE PERCENT: 0 % (ref 0–3)
GFR AFRICAN AMERICAN: 53 ML/MIN/1.73M2
GFR NON-AFRICAN AMERICAN: 44 ML/MIN/1.73M2
GLUCOSE BLD-MCNC: 131 MG/DL (ref 70–99)
HCT VFR BLD CALC: 33.4 % (ref 37–47)
HEMOGLOBIN: 11.1 GM/DL (ref 12.5–16)
IMMATURE NEUTROPHIL %: 0.5 % (ref 0–0.43)
LYMPHOCYTES ABSOLUTE: 0.5 K/CU MM
LYMPHOCYTES RELATIVE PERCENT: 6 % (ref 24–44)
MCH RBC QN AUTO: 31.7 PG (ref 27–31)
MCHC RBC AUTO-ENTMCNC: 33.2 % (ref 32–36)
MCV RBC AUTO: 95.4 FL (ref 78–100)
MONOCYTES ABSOLUTE: 0.7 K/CU MM
MONOCYTES RELATIVE PERCENT: 9.3 % (ref 0–4)
NUCLEATED RBC %: 0 %
PDW BLD-RTO: 14.4 % (ref 11.7–14.9)
PLATELET # BLD: 235 K/CU MM (ref 140–440)
PMV BLD AUTO: 12.6 FL (ref 7.5–11.1)
POTASSIUM SERPL-SCNC: 4 MMOL/L (ref 3.5–5.1)
RBC # BLD: 3.5 M/CU MM (ref 4.2–5.4)
SEGMENTED NEUTROPHILS ABSOLUTE COUNT: 6.7 K/CU MM
SEGMENTED NEUTROPHILS RELATIVE PERCENT: 84.1 % (ref 36–66)
SODIUM BLD-SCNC: 139 MMOL/L (ref 135–145)
TOTAL IMMATURE NEUTOROPHIL: 0.04 K/CU MM
TOTAL NUCLEATED RBC: 0 K/CU MM
WBC # BLD: 8 K/CU MM (ref 4–10.5)

## 2020-12-11 PROCEDURE — 2500000003 HC RX 250 WO HCPCS: Performed by: INTERNAL MEDICINE

## 2020-12-11 PROCEDURE — 6370000000 HC RX 637 (ALT 250 FOR IP): Performed by: INTERNAL MEDICINE

## 2020-12-11 PROCEDURE — 6370000000 HC RX 637 (ALT 250 FOR IP): Performed by: FAMILY MEDICINE

## 2020-12-11 PROCEDURE — 85025 COMPLETE CBC W/AUTO DIFF WBC: CPT

## 2020-12-11 PROCEDURE — 99232 SBSQ HOSP IP/OBS MODERATE 35: CPT | Performed by: INTERNAL MEDICINE

## 2020-12-11 PROCEDURE — 80048 BASIC METABOLIC PNL TOTAL CA: CPT

## 2020-12-11 PROCEDURE — 6360000002 HC RX W HCPCS: Performed by: FAMILY MEDICINE

## 2020-12-11 PROCEDURE — 2000000000 HC ICU R&B

## 2020-12-11 PROCEDURE — 99233 SBSQ HOSP IP/OBS HIGH 50: CPT | Performed by: SURGERY

## 2020-12-11 RX ORDER — POTASSIUM CHLORIDE 7.45 MG/ML
10 INJECTION INTRAVENOUS
Status: ACTIVE | OUTPATIENT
Start: 2020-12-11 | End: 2020-12-11

## 2020-12-11 RX ORDER — CARVEDILOL 25 MG/1
25 TABLET ORAL DAILY
Status: DISCONTINUED | OUTPATIENT
Start: 2020-12-11 | End: 2020-12-14 | Stop reason: HOSPADM

## 2020-12-11 RX ORDER — OXYCODONE HYDROCHLORIDE AND ACETAMINOPHEN 5; 325 MG/1; MG/1
1 TABLET ORAL EVERY 6 HOURS PRN
Status: DISCONTINUED | OUTPATIENT
Start: 2020-12-11 | End: 2020-12-13

## 2020-12-11 RX ORDER — LABETALOL HYDROCHLORIDE 5 MG/ML
10 INJECTION, SOLUTION INTRAVENOUS EVERY 4 HOURS PRN
Status: DISCONTINUED | OUTPATIENT
Start: 2020-12-11 | End: 2020-12-13 | Stop reason: HOSPADM

## 2020-12-11 RX ORDER — NIFEDIPINE 30 MG/1
60 TABLET, EXTENDED RELEASE ORAL DAILY
Status: DISCONTINUED | OUTPATIENT
Start: 2020-12-11 | End: 2020-12-14 | Stop reason: HOSPADM

## 2020-12-11 RX ADMIN — NIFEDIPINE 60 MG: 30 TABLET, FILM COATED, EXTENDED RELEASE ORAL at 13:48

## 2020-12-11 RX ADMIN — METRONIDAZOLE 500 MG: 500 INJECTION, SOLUTION INTRAVENOUS at 08:27

## 2020-12-11 RX ADMIN — METRONIDAZOLE 500 MG: 500 INJECTION, SOLUTION INTRAVENOUS at 16:58

## 2020-12-11 RX ADMIN — METRONIDAZOLE 500 MG: 500 INJECTION, SOLUTION INTRAVENOUS at 02:11

## 2020-12-11 RX ADMIN — POTASSIUM CHLORIDE AND SODIUM CHLORIDE: 900; 300 INJECTION, SOLUTION INTRAVENOUS at 07:35

## 2020-12-11 RX ADMIN — CARVEDILOL 25 MG: 25 TABLET, FILM COATED ORAL at 13:48

## 2020-12-11 RX ADMIN — LEVOFLOXACIN 750 MG: 5 INJECTION, SOLUTION INTRAVENOUS at 21:51

## 2020-12-11 RX ADMIN — ASPIRIN 81 MG CHEWABLE TABLET 81 MG: 81 TABLET CHEWABLE at 08:27

## 2020-12-11 RX ADMIN — POTASSIUM CHLORIDE AND SODIUM CHLORIDE: 900; 300 INJECTION, SOLUTION INTRAVENOUS at 19:45

## 2020-12-11 NOTE — PROGRESS NOTES
Patient was feeling a little nauseated, stated she did have a small amount of emesis, it was wrapped up in her tissue

## 2020-12-11 NOTE — PROGRESS NOTES
Hospitalist Progress Note      Name:  Renetta Klein /Age/Sex: 1945  (66 y.o. female)   MRN & CSN:  [id number] & [id number] Admission Date/Time: 2020  8:21 PM   Location:  -A PCP: Sulma Goddard MD       Renetta Klein is a 66 y.o.  female  who presents with Abdominal Pain and Urinary Retention      Assessment and Plan:   Abdominal pain possibly secondary to perforated viscus vs acute diverticulitis  - started on clears   - IVF  - pain mx prn  - CT abd noted  -Surgery consulted, continue with conservative mx since stable  -Serial abdominal examination, does not appear to have acute abd on exam  -Continue with Levaquin and Flagyl    UYEN on ckd 2/3  - avoid nephrotoxic medication  - IVF  - monitor I/Os  - nephrology consulted    HypoK  - IVF with K  - labs not done today?, check STAT     COVID 19 neg     H/o Chronic Diastolic CHF   CAD  Hypertension - resume home coreg and nifedipine now  hyperlipidemia  Anemia  Rheumatoid arthritis            DVT ppx: SCDs  Code status: Full code            Diet DIET CLEAR LIQUID;   Code Status Full Code     Medications:   Medications:    potassium chloride  20 mEq Intravenous Once    metroNIDAZOLE  500 mg Intravenous Q8H    sodium chloride flush  10 mL Intravenous 2 times per day    aspirin  81 mg Oral Daily    levofloxacin  750 mg Intravenous Q48H      Infusions:    0.9% NaCl with KCl 40 mEq 100 mL/hr at 20 0735     PRN Meds: labetalol, 10 mg, Q4H PRN  morphine, 2 mg, Q3H PRN  sodium chloride flush, 10 mL, PRN  acetaminophen, 650 mg, Q6H PRN    Or  acetaminophen, 650 mg, Q6H PRN  magnesium hydroxide, 30 mL, Daily PRN  promethazine, 12.5 mg, Q6H PRN    Or  ondansetron, 4 mg, Q6H PRN      Subjective:   Doing ok, no distress    Objective:        Intake/Output Summary (Last 24 hours) at 2020 1110  Last data filed at 2020 0437  Gross per 24 hour   Intake 4020.33 ml   Output 2425 ml   Net 1595.33 ml      Vitals:   Vitals: 12/11/20 1000   BP: (!) 150/58   Pulse: 88   Resp: 21   Temp:    SpO2: 99%     Physical Exam:   Gen:  awake, alert, no apparent distress  Head/Eyes:  Normocephalic atraumatic, EOMI   NECK:   symmetrical, trachea midline  LUNGS: Normal Effort   CARDIOVASCULAR:  Normal rate  ABDOMEN:  non distended  MUSCULOSKELETAL:  ROM WNL  NEUROLOGIC: Alert and Oriented,  Cranial nerves II-XII are grossly intact.    SKIN:  no bruising or bleeding, normal skin color,  no redness      Data:       CBC   Recent Labs     12/09/20  1611 12/10/20  1338   WBC 9.9 7.9   HGB 11.4* 10.6*   HCT 33.7* 32.0*    185      BMP   Recent Labs     12/09/20  1611 12/10/20  1338   * 136   K 3.5 3.0*   CL 93* 102   CO2 21 19*   PHOS  --  3.4   BUN 40* 45*   CREATININE 3.0* 2.3*         Electronically signed by Javier Meza MD on 12/11/2020 at 11:10 AM

## 2020-12-11 NOTE — PROGRESS NOTES
Well developed, Well nourished, No acute distress, Non-toxic appearance. HENT:  Normocephalic, Atraumatic, Bilateral external ears normal, Oropharynx moist, No oral exudates, Nose normal. Neck- Normal range of motion, No tenderness, Supple, No stridor. Eyes:  PERRL, EOMI, Conjunctiva normal, No discharge. Respiratory:  Normal breath sounds, No respiratory distress, No wheezing, No chest tenderness. Cardiovascular:  Normal heart rate, Normal rhythm, No murmurs, No rubs, No gallops, JVP not elevated  Abdomen/GI:  Bowel sounds normal, Soft, No tenderness, No masses, No pulsatile masses. Musculoskeletal:  Intact distal pulses, No edema, No tenderness, No cyanosis, No clubbing. Good range of motion in all major joints. No tenderness to palpation or major deformities noted. Back- No tenderness. Integument:  Warm, Dry, No erythema, No rash. Lymphatic:  No lymphadenopathy noted. Neurologic:  Alert & oriented x 3, Normal motor function, Normal sensory function, No focal deficits noted. Psychiatric:  Affect  and  Mood :no change    Medications:    carvedilol  25 mg Oral Daily    NIFEdipine  60 mg Oral Daily    metroNIDAZOLE  500 mg Intravenous Q8H    sodium chloride flush  10 mL Intravenous 2 times per day    aspirin  81 mg Oral Daily    levofloxacin  750 mg Intravenous Q48H      0.9% NaCl with KCl 40 mEq 100 mL/hr at 12/11/20 0735     labetalol, oxyCODONE-acetaminophen, morphine, sodium chloride flush, acetaminophen **OR** acetaminophen, magnesium hydroxide, promethazine **OR** ondansetron    Lab Data:  CBC:   Recent Labs     12/09/20  1611 12/10/20  1338   WBC 9.9 7.9   HGB 11.4* 10.6*   HCT 33.7* 32.0*   MCV 94.9 95.0    185     BMP:   Recent Labs     12/09/20  1611 12/10/20  1338   * 136   K 3.5 3.0*   CL 93* 102   CO2 21 19*   PHOS  --  3.4   BUN 40* 45*   CREATININE 3.0* 2.3*     PT/INR: No results for input(s): PROTIME, INR in the last 72 hours.   BNP:  No results for input(s): PROBNP in the last 72 hours. TROPONIN: No results for input(s): TROPONINT in the last 72 hours. ECHO :   echocardiogram    Assessment:  66 y. o.year old who is admitted for  Chief Complaint   Patient presents with    Abdominal Pain    Urinary Retention    , active issues as noted below:  Impression:  Active Problems:    ASCVD (arteriosclerotic cardiovascular disease)    Essential hypertension    Abdominal pain  Resolved Problems:    * No resolved hospital problems. *            All labs, medications and tests reviewed by myself , continue all other medications of all above medical condition listed as is except for changes mentioned above. Thank you very much for consult , please call with questions.     Kelly Hirsch MD 12/11/2020 4:14 PM

## 2020-12-11 NOTE — FLOWSHEET NOTE
Called phlebot regarding outstanding labs that floor was unable to collect. Phlebot states someone will be up to draw labs.

## 2020-12-11 NOTE — CONSULTS
mg  2 mg Intravenous Q3H PRN Rony Marcos Bull MD        sodium chloride flush 0.9 % injection 10 mL  10 mL Intravenous 2 times per day Christian Osorio MD   10 mL at 12/10/20 0925    sodium chloride flush 0.9 % injection 10 mL  10 mL Intravenous PRN Rony Marcos Bull MD        acetaminophen (TYLENOL) tablet 650 mg  650 mg Oral Q6H PRN Rony Marcos Bull MD        Or    acetaminophen (TYLENOL) suppository 650 mg  650 mg Rectal Q6H PRN Rony Marcos Bull MD        magnesium hydroxide (MILK OF MAGNESIA) 400 MG/5ML suspension 30 mL  30 mL Oral Daily PRN Rony Marcos Bull MD        promethazine (PHENERGAN) tablet 12.5 mg  12.5 mg Oral Q6H PRN Rony Marcos Bull MD        Or    ondansetron [facility]) injection 4 mg  4 mg Intravenous Q6H PRN Rony Marcos Bull MD   4 mg at 12/10/20 2123    aspirin chewable tablet 81 mg  81 mg Oral Daily Berkley Jones MD   Stopped at 12/10/20 1200    0.9% NaCl with KCl 40 mEq infusion   Intravenous Continuous Tequila Hutchinson  mL/hr at 12/10/20 1941      [START ON 12/11/2020] levoFLOXacin (LEVAQUIN) 750 MG/150ML infusion 750 mg  750 mg Intravenous Q48H Tequila Hutchinson MD           Allergies:  Pcn [penicillins]    Social History:   Social History     Socioeconomic History    Marital status: Single     Spouse name: None    Number of children: None    Years of education: None    Highest education level: None   Occupational History    None   Social Needs    Financial resource strain: None    Food insecurity     Worry: None     Inability: None    Transportation needs     Medical: None     Non-medical: None   Tobacco Use    Smoking status: Never Smoker    Smokeless tobacco: Never Used   Substance and Sexual Activity    Alcohol use: No     Comment: 1 cup of coffee daily    Drug use: No    Sexual activity: None   Lifestyle    Physical activity     Days per week: None     Minutes per session: None    Stress: None   Relationships    Social connections     Talks on phone: None Appearance: She is well-developed. HENT:      Head: Normocephalic. Eyes:      Pupils: Pupils are equal, round, and reactive to light. Neck:      Musculoskeletal: Normal range of motion and neck supple. Cardiovascular:      Rate and Rhythm: Normal rate. Pulmonary:      Effort: Pulmonary effort is normal.   Abdominal:      General: There is no distension. Palpations: Abdomen is soft. There is no mass. Tenderness: Tenderness: no peritoneal sign. There is no guarding or rebound. Musculoskeletal: Normal range of motion. Skin:     General: Skin is warm. Neurological:      Mental Status: She is alert and oriented to person, place, and time.            DATA:    CBC with Differential:    Lab Results   Component Value Date    WBC 7.9 12/10/2020    RBC 3.37 12/10/2020    HGB 10.6 12/10/2020    HCT 32.0 12/10/2020     12/10/2020    MCV 95.0 12/10/2020    MCH 31.5 12/10/2020    MCHC 33.1 12/10/2020    RDW 14.0 12/10/2020    SEGSPCT 85.0 12/10/2020    LYMPHOPCT 6.8 12/10/2020    MONOPCT 7.6 12/10/2020    BASOPCT 0.1 12/10/2020    MONOSABS 0.6 12/10/2020    LYMPHSABS 0.5 12/10/2020    EOSABS 0.0 12/10/2020    BASOSABS 0.0 12/10/2020    DIFFTYPE AUTOMATED DIFFERENTIAL 12/10/2020     CMP:    Lab Results   Component Value Date     12/10/2020    K 3.0 12/10/2020     12/10/2020    CO2 19 12/10/2020    BUN 45 12/10/2020    CREATININE 2.3 12/10/2020    GFRAA 25 12/10/2020    LABGLOM 21 12/10/2020    GLUCOSE 86 12/10/2020    PROT 6.2 12/10/2020    LABALBU 3.3 12/10/2020    CALCIUM 8.4 12/10/2020    BILITOT 0.6 12/10/2020    ALKPHOS 59 12/10/2020    AST 22 12/10/2020    ALT 13 12/10/2020       IMPRESSION:        Patient Active Problem List:     Essential hypertension     Rheumatoid arthritis of multiple sites with negative rheumatoid factor (HCC)     Dysuria     Chronic kidney disease, stage III (moderate)     Acute renal failure with acute cortical necrosis (HCC)     Congestive heart failure

## 2020-12-11 NOTE — PROGRESS NOTES
Nephrology Progress Note  12/11/2020 10:59 AM  Subjective: Interval History: Ingrid Chase is a 66 y.o. female with abd pain and not worse., not plan surgery for now        Data:   Scheduled Meds:   metroNIDAZOLE  500 mg Intravenous Q8H    sodium chloride flush  10 mL Intravenous 2 times per day    aspirin  81 mg Oral Daily    levofloxacin  750 mg Intravenous Q48H     Continuous Infusions:   0.9% NaCl with KCl 40 mEq 100 mL/hr at 12/11/20 0735           CBC:   Recent Labs     12/09/20  1611 12/10/20  1338   WBC 9.9 7.9   HGB 11.4* 10.6*    185     BMP:    Recent Labs     12/09/20  1611 12/10/20  1338   * 136   K 3.5 3.0*   CL 93* 102   CO2 21 19*   BUN 40* 45*   CREATININE 3.0* 2.3*   GLUCOSE 134* 86       Renal Labs  Albumin:    Lab Results   Component Value Date    LABALBU 3.3 12/10/2020     Calcium:    Lab Results   Component Value Date    CALCIUM 8.4 12/10/2020     Phosphorus:    Lab Results   Component Value Date    PHOS 3.4 12/10/2020     U/A:    Lab Results   Component Value Date    NITRU NEGATIVE 12/09/2020    NITRU Negative 02/18/2020    COLORU JOVANNY 12/09/2020    PHUR 5.0 02/18/2020    LABCAST CANCELED 10/22/2018    LABCAST CANCELED 10/22/2018    WBCUA 4 12/09/2020    RBCUA NONE SEEN 12/09/2020    MUCUS RARE 12/09/2020    TRICHOMONAS NONE SEEN 12/09/2020    YEAST CANCELED 10/22/2018    BACTERIA NEGATIVE 12/09/2020    CLARITYU SLIGHTLY CLOUDY 12/09/2020    SPECGRAV 1.019 12/09/2020    UROBILINOGEN 1 12/09/2020    BILIRUBINUR SMALL 12/09/2020    BLOODU NEGATIVE 12/09/2020    GLUCOSEU Negative 02/18/2020    KETUA NEGATIVE 12/09/2020           Objective:   I/O: 12/10 0701 - 12/11 0700  In: 4020.3 [I.V.:3634.3]  Out: 2425 [Urine:2425]  Vitals: BP (!) 150/58   Pulse 88   Temp 98.9 °F (37.2 °C) (Axillary)   Resp 21   Ht 5' 1\" (1.549 m)   Wt 180 lb (81.6 kg)   SpO2 99%   BMI 34.01 kg/m²   General appearance: awake weak  HEENT: Head: Normal, normocephalic, atraumatic.   Neck: supple, symmetrical, trachea midline  Lungs: diminished breath sounds bilaterally  Heart: S1, S2 normal  Abdomen: abnormal findings:  soft  Mild tender  Extremities: edema trace  Neurologic: Mental status: alertness: alert        Assessment and Plan:      IMP:  arf from atn on ckd 3  Perforated viscus  Cad  htn  Low K    Plan     Creat to 2.3 and stable uop monitor with hydration  onabx and surgery monitor and not surgery for now  Cardiac stable  bp increase and add beta blocker  Wei Guzmán MD

## 2020-12-12 LAB
ANION GAP SERPL CALCULATED.3IONS-SCNC: 11 MMOL/L (ref 4–16)
BASOPHILS ABSOLUTE: 0 K/CU MM
BASOPHILS RELATIVE PERCENT: 0.3 % (ref 0–1)
BUN BLDV-MCNC: 28 MG/DL (ref 6–23)
CALCIUM SERPL-MCNC: 9.3 MG/DL (ref 8.3–10.6)
CHLORIDE BLD-SCNC: 104 MMOL/L (ref 99–110)
CO2: 22 MMOL/L (ref 21–32)
CREAT SERPL-MCNC: 1.1 MG/DL (ref 0.6–1.1)
DIFFERENTIAL TYPE: ABNORMAL
EOSINOPHILS ABSOLUTE: 0 K/CU MM
EOSINOPHILS RELATIVE PERCENT: 0.1 % (ref 0–3)
GFR AFRICAN AMERICAN: 58 ML/MIN/1.73M2
GFR NON-AFRICAN AMERICAN: 48 ML/MIN/1.73M2
GLUCOSE BLD-MCNC: 137 MG/DL (ref 70–99)
HCT VFR BLD CALC: 34.9 % (ref 37–47)
HEMOGLOBIN: 11.6 GM/DL (ref 12.5–16)
IMMATURE NEUTROPHIL %: 0.5 % (ref 0–0.43)
LYMPHOCYTES ABSOLUTE: 0.5 K/CU MM
LYMPHOCYTES RELATIVE PERCENT: 6.5 % (ref 24–44)
MCH RBC QN AUTO: 31.7 PG (ref 27–31)
MCHC RBC AUTO-ENTMCNC: 33.2 % (ref 32–36)
MCV RBC AUTO: 95.4 FL (ref 78–100)
MONOCYTES ABSOLUTE: 0.8 K/CU MM
MONOCYTES RELATIVE PERCENT: 10 % (ref 0–4)
NUCLEATED RBC %: 0 %
PDW BLD-RTO: 14.4 % (ref 11.7–14.9)
PLATELET # BLD: 233 K/CU MM (ref 140–440)
PMV BLD AUTO: 12.4 FL (ref 7.5–11.1)
POTASSIUM SERPL-SCNC: 3.9 MMOL/L (ref 3.5–5.1)
RBC # BLD: 3.66 M/CU MM (ref 4.2–5.4)
SEGMENTED NEUTROPHILS ABSOLUTE COUNT: 6.3 K/CU MM
SEGMENTED NEUTROPHILS RELATIVE PERCENT: 82.6 % (ref 36–66)
SODIUM BLD-SCNC: 137 MMOL/L (ref 135–145)
TOTAL IMMATURE NEUTOROPHIL: 0.04 K/CU MM
TOTAL NUCLEATED RBC: 0 K/CU MM
WBC # BLD: 7.7 K/CU MM (ref 4–10.5)

## 2020-12-12 PROCEDURE — 2580000003 HC RX 258: Performed by: INTERNAL MEDICINE

## 2020-12-12 PROCEDURE — 80048 BASIC METABOLIC PNL TOTAL CA: CPT

## 2020-12-12 PROCEDURE — 6360000002 HC RX W HCPCS: Performed by: FAMILY MEDICINE

## 2020-12-12 PROCEDURE — 94761 N-INVAS EAR/PLS OXIMETRY MLT: CPT

## 2020-12-12 PROCEDURE — 6370000000 HC RX 637 (ALT 250 FOR IP): Performed by: PHYSICIAN ASSISTANT

## 2020-12-12 PROCEDURE — 85025 COMPLETE CBC W/AUTO DIFF WBC: CPT

## 2020-12-12 PROCEDURE — 2000000000 HC ICU R&B

## 2020-12-12 PROCEDURE — 6370000000 HC RX 637 (ALT 250 FOR IP): Performed by: FAMILY MEDICINE

## 2020-12-12 PROCEDURE — 2500000003 HC RX 250 WO HCPCS: Performed by: INTERNAL MEDICINE

## 2020-12-12 PROCEDURE — 99233 SBSQ HOSP IP/OBS HIGH 50: CPT | Performed by: SURGERY

## 2020-12-12 PROCEDURE — 6360000002 HC RX W HCPCS: Performed by: INTERNAL MEDICINE

## 2020-12-12 PROCEDURE — C9113 INJ PANTOPRAZOLE SODIUM, VIA: HCPCS | Performed by: FAMILY MEDICINE

## 2020-12-12 PROCEDURE — 6370000000 HC RX 637 (ALT 250 FOR IP): Performed by: INTERNAL MEDICINE

## 2020-12-12 PROCEDURE — 2580000003 HC RX 258: Performed by: FAMILY MEDICINE

## 2020-12-12 RX ORDER — PANTOPRAZOLE SODIUM 40 MG/10ML
40 INJECTION, POWDER, LYOPHILIZED, FOR SOLUTION INTRAVENOUS 2 TIMES DAILY
Status: DISCONTINUED | OUTPATIENT
Start: 2020-12-12 | End: 2020-12-14 | Stop reason: HOSPADM

## 2020-12-12 RX ORDER — ZOLPIDEM TARTRATE 5 MG/1
5 TABLET ORAL NIGHTLY PRN
Status: DISCONTINUED | OUTPATIENT
Start: 2020-12-12 | End: 2020-12-14 | Stop reason: HOSPADM

## 2020-12-12 RX ORDER — SODIUM CHLORIDE 9 MG/ML
INJECTION, SOLUTION INTRAVENOUS CONTINUOUS
Status: DISCONTINUED | OUTPATIENT
Start: 2020-12-12 | End: 2020-12-13

## 2020-12-12 RX ORDER — LANOLIN ALCOHOL/MO/W.PET/CERES
9 CREAM (GRAM) TOPICAL NIGHTLY PRN
Status: DISCONTINUED | OUTPATIENT
Start: 2020-12-12 | End: 2020-12-14 | Stop reason: HOSPADM

## 2020-12-12 RX ADMIN — SODIUM CHLORIDE: 9 INJECTION, SOLUTION INTRAVENOUS at 10:57

## 2020-12-12 RX ADMIN — PANTOPRAZOLE SODIUM 40 MG: 40 INJECTION, POWDER, FOR SOLUTION INTRAVENOUS at 18:25

## 2020-12-12 RX ADMIN — SODIUM CHLORIDE, PRESERVATIVE FREE 10 ML: 5 INJECTION INTRAVENOUS at 21:03

## 2020-12-12 RX ADMIN — POTASSIUM CHLORIDE AND SODIUM CHLORIDE: 900; 300 INJECTION, SOLUTION INTRAVENOUS at 04:51

## 2020-12-12 RX ADMIN — METRONIDAZOLE 500 MG: 500 INJECTION, SOLUTION INTRAVENOUS at 01:30

## 2020-12-12 RX ADMIN — ASPIRIN 81 MG CHEWABLE TABLET 81 MG: 81 TABLET CHEWABLE at 08:17

## 2020-12-12 RX ADMIN — SODIUM CHLORIDE, PRESERVATIVE FREE 10 ML: 5 INJECTION INTRAVENOUS at 08:18

## 2020-12-12 RX ADMIN — MELATONIN TAB 3 MG 9 MG: 3 TAB at 22:58

## 2020-12-12 RX ADMIN — NIFEDIPINE 60 MG: 30 TABLET, FILM COATED, EXTENDED RELEASE ORAL at 08:18

## 2020-12-12 RX ADMIN — METRONIDAZOLE 500 MG: 500 INJECTION, SOLUTION INTRAVENOUS at 08:17

## 2020-12-12 RX ADMIN — SODIUM CHLORIDE: 9 INJECTION, SOLUTION INTRAVENOUS at 22:55

## 2020-12-12 RX ADMIN — METRONIDAZOLE 500 MG: 500 INJECTION, SOLUTION INTRAVENOUS at 18:04

## 2020-12-12 RX ADMIN — ONDANSETRON 4 MG: 2 INJECTION INTRAMUSCULAR; INTRAVENOUS at 05:44

## 2020-12-12 RX ADMIN — CARVEDILOL 25 MG: 25 TABLET, FILM COATED ORAL at 08:17

## 2020-12-12 ASSESSMENT — ENCOUNTER SYMPTOMS
CONSTIPATION: 0
STRIDOR: 0
SORE THROAT: 0
APNEA: 0
ABDOMINAL PAIN: 1
CHOKING: 0
PHOTOPHOBIA: 0
RECTAL PAIN: 0
ANAL BLEEDING: 0
COLOR CHANGE: 0
EYE ITCHING: 0
EYE REDNESS: 0
BACK PAIN: 0

## 2020-12-12 ASSESSMENT — PAIN SCALES - GENERAL: PAINLEVEL_OUTOF10: 0

## 2020-12-12 NOTE — PROGRESS NOTES
Patient c/o acid reflux feeling in her throat. Stated she takes tums at home and is ineffective. Also c/o not being able to sleep at night the past few nights. Notified Dr. Manny Jules. Orders received and processed.

## 2020-12-12 NOTE — PROGRESS NOTES
Nephrology Progress Note  12/12/2020 10:45 AM  Subjective:      Interval History: Ha Law is a 66 y.o. female  Appear stable and nad      Data:   Scheduled Meds:   carvedilol  25 mg Oral Daily    NIFEdipine  60 mg Oral Daily    metroNIDAZOLE  500 mg Intravenous Q8H    sodium chloride flush  10 mL Intravenous 2 times per day    aspirin  81 mg Oral Daily    levofloxacin  750 mg Intravenous Q48H     Continuous Infusions:   sodium chloride             CBC:   Recent Labs     12/10/20  1338 12/11/20  1551 12/12/20  0546   WBC 7.9 8.0 7.7   HGB 10.6* 11.1* 11.6*    235 233     BMP:    Recent Labs     12/10/20  1338 12/11/20  1551 12/12/20  0546    139 137   K 3.0* 4.0 3.9    104 104   CO2 19* 22 22   BUN 45* 28* 28*   CREATININE 2.3* 1.2* 1.1   GLUCOSE 86 131* 137*       Renal Labs  Albumin:    Lab Results   Component Value Date    LABALBU 3.3 12/10/2020     Calcium:    Lab Results   Component Value Date    CALCIUM 9.3 12/12/2020     Phosphorus:    Lab Results   Component Value Date    PHOS 3.4 12/10/2020     U/A:    Lab Results   Component Value Date    NITRU NEGATIVE 12/09/2020    NITRU Negative 02/18/2020    COLORU JOVANNY 12/09/2020    PHUR 5.0 02/18/2020    LABCAST CANCELED 10/22/2018    LABCAST CANCELED 10/22/2018    WBCUA 4 12/09/2020    RBCUA NONE SEEN 12/09/2020    MUCUS RARE 12/09/2020    TRICHOMONAS NONE SEEN 12/09/2020    YEAST CANCELED 10/22/2018    BACTERIA NEGATIVE 12/09/2020    CLARITYU SLIGHTLY CLOUDY 12/09/2020    SPECGRAV 1.019 12/09/2020    UROBILINOGEN 1 12/09/2020    BILIRUBINUR SMALL 12/09/2020    BLOODU NEGATIVE 12/09/2020    GLUCOSEU Negative 02/18/2020    KETUA NEGATIVE 12/09/2020           Objective:   I/O: 12/11 0701 - 12/12 0700  In: 3063 [P.O.:390; I.V.:2223]  Out: 1325 [Urine:1225]  Vitals: BP (!) 116/55   Pulse 65   Temp 97.9 °F (36.6 °C) (Oral)   Resp 20   Ht 5' 1\" (1.549 m)   Wt 180 lb (81.6 kg)   SpO2 98%   BMI 34.01 kg/m²   General appearance: awake weak  HEENT: Head: Normal, normocephalic, atraumatic.   Neck: supple, symmetrical, trachea midline  Lungs: diminished breath sounds bilaterally  Heart: S1, S2 normal  Abdomen: abnormal findings:  soft  Mild tender  Extremities: edema trace  Neurologic: Mental status: alertness: alert        Assessment and Plan:      IMP:  arf from atn on ckd 3  Perforated viscus  Cad  htn  Low K    Plan     Renal to baseline mnitor  Medical therapy not surgery for now  Cardiac stable  'bp stable  K stable  Can work dc plan if ok surgery           Ramya Kenny MD

## 2020-12-12 NOTE — PROGRESS NOTES
Hospitalist Progress Note      Name:  Austin Escoto /Age/Sex: 1945  (66 y.o. female)   MRN & CSN:  [id number] & [id number] Admission Date/Time: 2020  8:21 PM   Location:  -A PCP: Jose Angel Vanessa MD       Austin Escoto is a 66 y.o.  female  who presents with Abdominal Pain and Urinary Retention      Assessment and Plan:   Abdominal pain possibly secondary to perforated viscus vs acute diverticulitis  - started on clears, advance when okay with surgery  - IVF  - pain mx prn  - CT abd noted  -Surgery consulted, continue with conservative mx since stable  -Serial abdominal examination, does not appear to have acute abd on exam  -Continue with Levaquin and Flagyl     UYEN on ckd 2/3  - stable now  - avoid nephrotoxic medication  - IVF  - monitor I/Os  - nephrology consulted     HypoK  - replaced     COVID 19 neg     H/o Chronic Diastolic CHF   CAD  Hypertension - resume home coreg and nifedipine now  hyperlipidemia  Anemia  Rheumatoid arthritis            DVT ppx: SCDs  Code status: Full code            Diet DIET CLEAR LIQUID;   Code Status Full Code     Medications:   Medications:    carvedilol  25 mg Oral Daily    NIFEdipine  60 mg Oral Daily    metroNIDAZOLE  500 mg Intravenous Q8H    sodium chloride flush  10 mL Intravenous 2 times per day    aspirin  81 mg Oral Daily    levofloxacin  750 mg Intravenous Q48H      Infusions:    0.9% NaCl with KCl 40 mEq 100 mL/hr at 20 0451     PRN Meds:     labetalol, 10 mg, Q4H PRN      oxyCODONE-acetaminophen, 1 tablet, Q6H PRN      morphine, 2 mg, Q3H PRN      sodium chloride flush, 10 mL, PRN      acetaminophen, 650 mg, Q6H PRN    Or      acetaminophen, 650 mg, Q6H PRN      magnesium hydroxide, 30 mL, Daily PRN      promethazine, 12.5 mg, Q6H PRN    Or      ondansetron, 4 mg, Q6H PRN      Subjective:     Doing ok, no distress  Objective:        Intake/Output Summary (Last 24 hours) at 2020 1015  Last data filed at 12/12/2020 0549  Gross per 24 hour   Intake 3063 ml   Output 1325 ml   Net 1738 ml      Vitals:   Vitals:    12/12/20 1000   BP: (!) 116/55   Pulse: 65   Resp: 20   Temp:    SpO2: 98%     Physical Exam:   Gen:  awake, alert, no apparent distress  Head/Eyes:  Normocephalic atraumatic, EOMI   NECK:   symmetrical, trachea midline  LUNGS: Normal Effort   CARDIOVASCULAR:  Normal rate  ABDOMEN:  non distended  MUSCULOSKELETAL:  ROM WNL  NEUROLOGIC: Alert and Oriented,  Cranial nerves II-XII are grossly intact.    SKIN:  no bruising or bleeding, normal skin color,  no redness      Data:       CBC   Recent Labs     12/10/20  1338 12/11/20  1551 12/12/20  0546   WBC 7.9 8.0 7.7   HGB 10.6* 11.1* 11.6*   HCT 32.0* 33.4* 34.9*    235 233      BMP   Recent Labs     12/10/20  1338 12/11/20  1551 12/12/20  0546    139 137   K 3.0* 4.0 3.9    104 104   CO2 19* 22 22   PHOS 3.4  --   --    BUN 45* 28* 28*   CREATININE 2.3* 1.2* 1.1         Electronically signed by Isamar Gallagher MD on 12/12/2020 at 10:15 AM

## 2020-12-12 NOTE — PROGRESS NOTES
General Surgery-[facility] Day: 4    Chief Complaint on Admission: Acute diverticulitis with microperforation      Subjective:     Brenna Lopez is a 66 y.o. female with acute diverticulitis with microperforation. Patient denies abdominal pain. Tolerating DIET GENERAL; Low Sodium (2 GM). - BM.     ROS:  Review of Systems   Constitutional: Negative for chills and fever. HENT: Negative for ear pain, mouth sores, sore throat and tinnitus. Eyes: Negative for photophobia, redness and itching. Respiratory: Negative for apnea, choking and stridor. Cardiovascular: Negative for chest pain and palpitations. Gastrointestinal: Positive for abdominal pain. Negative for anal bleeding, constipation and rectal pain. Endocrine: Negative for polydipsia. Genitourinary: Negative for enuresis, flank pain and hematuria. Musculoskeletal: Negative for back pain, joint swelling and myalgias. Skin: Negative for color change and pallor. Allergic/Immunologic: Negative for environmental allergies. Neurological: Negative for syncope and speech difficulty. Psychiatric/Behavioral: Negative for confusion and hallucinations. Allergies  Pcn [penicillins]          Diagnosis Date    Arthritis     CAD (coronary artery disease) 2018    Chronic kidney disease, stage III (moderate) 2017    H/O Doppler lower venous ultrasound 2019    No DVT or SVT, Significant reflux in RGSV and LGSV    H/O echocardiogram 2018    Limited study to evaluate LV function. Left ventricular size is normal Lv function is normal EF 50-55% EF has improved since last echo.  Hypertension        Objective:     Vitals:    20 1300   BP: (!) 104/90   Pulse: 68   Resp: 19   Temp:    SpO2: 100%       TEMPERATURE:  Current - Temp: 97.9 °F (36.6 °C); Max - Temp  Av.8 °F (36.6 °C)  Min: 97.1 °F (36.2 °C)  Max: 98.2 °F (36.8 °C)    No intake/output data recorded. I/O last 3 completed shifts:   In: 3063 [P.O.:390; I.V.:2223; IV Piggyback:450]  Out: 0221 [Urine:1225; Emesis/NG output:100]      Physical Exam:  Physical Exam  Constitutional:       General: She is not in acute distress. Neck:      Musculoskeletal: Neck supple. Pulmonary:      Effort: Pulmonary effort is normal.   Abdominal:      Palpations: Abdomen is soft. Skin:     General: Skin is warm. Neurological:      Mental Status: She is alert.              Scheduled Meds:   carvedilol  25 mg Oral Daily    NIFEdipine  60 mg Oral Daily    metroNIDAZOLE  500 mg Intravenous Q8H    sodium chloride flush  10 mL Intravenous 2 times per day    aspirin  81 mg Oral Daily    levofloxacin  750 mg Intravenous Q48H     Continuous Infusions:   sodium chloride 75 mL/hr at 12/12/20 1057     PRN Meds:labetalol, oxyCODONE-acetaminophen, morphine, sodium chloride flush, acetaminophen **OR** acetaminophen, magnesium hydroxide, promethazine **OR** ondansetron      Labs/Imaging Results:   Lab Results   Component Value Date    WBC 7.7 12/12/2020    HGB 11.6 (L) 12/12/2020    HCT 34.9 (L) 12/12/2020    MCV 95.4 12/12/2020     12/12/2020     Lab Results   Component Value Date     12/12/2020    K 3.9 12/12/2020     12/12/2020    CO2 22 12/12/2020    BUN 28 (H) 12/12/2020    CREATININE 1.1 12/12/2020    GLUCOSE 137 (H) 12/12/2020    CALCIUM 9.3 12/12/2020    PROT 6.2 (L) 12/10/2020    LABALBU 3.3 (L) 12/10/2020    BILITOT 0.6 12/10/2020    ALKPHOS 59 12/10/2020    AST 22 12/10/2020    ALT 13 12/10/2020    LABGLOM 48 (L) 12/12/2020    GFRAA 58 (L) 12/12/2020       Assessment:     Patient Active Problem List:     Essential hypertension     Rheumatoid arthritis of multiple sites with negative rheumatoid factor (HCC)     Dysuria     Chronic kidney disease, stage III (moderate)     Acute renal failure with acute cortical necrosis (HCC)     Congestive heart failure (HCC)     ASCVD (arteriosclerotic cardiovascular disease)     Anemia     Ankle swelling determined by examination     Dyslipidemia     Bruit of right carotid artery     Abdominal pain      Diverticulitis with microperforation    Plan:       Patient is doing well tolerating clears and continue with nonsurgical management of acute diverticulitis  Advance diet to full's  Await bowel function  Okay to transfer to Kamala Marti MD

## 2020-12-12 NOTE — PROGRESS NOTES
General Surgery-[facility] Day: 4    Chief Complaint on Admission: Acute diverticulitis with microperforation      Subjective:     Nicholas Ambrose is a 66 y.o. female with acute diverticulitis with microperforation. Patient denies abdominal pain. Tolerating DIET GENERAL; Low Sodium (2 GM). - BM.     ROS:  Review of Systems   Constitutional: Negative for chills and fever. HENT: Negative for ear pain, mouth sores, sore throat and tinnitus. Eyes: Negative for photophobia, redness and itching. Respiratory: Negative for apnea, choking and stridor. Cardiovascular: Negative for chest pain and palpitations. Gastrointestinal: Positive for abdominal pain. Negative for anal bleeding, constipation and rectal pain. Endocrine: Negative for polydipsia. Genitourinary: Negative for enuresis, flank pain and hematuria. Musculoskeletal: Negative for back pain, joint swelling and myalgias. Skin: Negative for color change and pallor. Allergic/Immunologic: Negative for environmental allergies. Neurological: Negative for syncope and speech difficulty. Psychiatric/Behavioral: Negative for confusion and hallucinations. Allergies  Pcn [penicillins]          Diagnosis Date    Arthritis     CAD (coronary artery disease) 2018    Chronic kidney disease, stage III (moderate) 2017    H/O Doppler lower venous ultrasound 2019    No DVT or SVT, Significant reflux in RGSV and LGSV    H/O echocardiogram 2018    Limited study to evaluate LV function. Left ventricular size is normal Lv function is normal EF 50-55% EF has improved since last echo.  Hypertension        Objective:     Vitals:    20 1300   BP: (!) 104/90   Pulse: 68   Resp: 19   Temp:    SpO2: 100%       TEMPERATURE:  Current - Temp: 97.9 °F (36.6 °C); Max - Temp  Av.8 °F (36.6 °C)  Min: 97.1 °F (36.2 °C)  Max: 98.2 °F (36.8 °C)    No intake/output data recorded. I/O last 3 completed shifts:   In: 3063 [P.O.:390; I.V.:2223; IV Piggyback:450]  Out: 4434 [Urine:1225; Emesis/NG output:100]      Physical Exam:  Physical Exam  Constitutional:       General: She is not in acute distress. Neck:      Musculoskeletal: Neck supple. Pulmonary:      Effort: Pulmonary effort is normal.   Abdominal:      Palpations: Abdomen is soft. Skin:     General: Skin is warm. Neurological:      Mental Status: She is alert.              Scheduled Meds:   carvedilol  25 mg Oral Daily    NIFEdipine  60 mg Oral Daily    metroNIDAZOLE  500 mg Intravenous Q8H    sodium chloride flush  10 mL Intravenous 2 times per day    aspirin  81 mg Oral Daily    levofloxacin  750 mg Intravenous Q48H     Continuous Infusions:   sodium chloride 75 mL/hr at 12/12/20 1057     PRN Meds:labetalol, oxyCODONE-acetaminophen, morphine, sodium chloride flush, acetaminophen **OR** acetaminophen, magnesium hydroxide, promethazine **OR** ondansetron      Labs/Imaging Results:   Lab Results   Component Value Date    WBC 7.7 12/12/2020    HGB 11.6 (L) 12/12/2020    HCT 34.9 (L) 12/12/2020    MCV 95.4 12/12/2020     12/12/2020     Lab Results   Component Value Date     12/12/2020    K 3.9 12/12/2020     12/12/2020    CO2 22 12/12/2020    BUN 28 (H) 12/12/2020    CREATININE 1.1 12/12/2020    GLUCOSE 137 (H) 12/12/2020    CALCIUM 9.3 12/12/2020    PROT 6.2 (L) 12/10/2020    LABALBU 3.3 (L) 12/10/2020    BILITOT 0.6 12/10/2020    ALKPHOS 59 12/10/2020    AST 22 12/10/2020    ALT 13 12/10/2020    LABGLOM 48 (L) 12/12/2020    GFRAA 58 (L) 12/12/2020       Assessment:     Patient Active Problem List:     Essential hypertension     Rheumatoid arthritis of multiple sites with negative rheumatoid factor (HCC)     Dysuria     Chronic kidney disease, stage III (moderate)     Acute renal failure with acute cortical necrosis (HCC)     Congestive heart failure (HCC)     ASCVD (arteriosclerotic cardiovascular disease)     Anemia     Ankle swelling determined by examination     Dyslipidemia     Bruit of right carotid artery     Abdominal pain      Diverticulitis with microperforation    Plan:       Wbc improving  Await bowel function      Alessandra Walters MD

## 2020-12-13 LAB
ANION GAP SERPL CALCULATED.3IONS-SCNC: 9 MMOL/L (ref 4–16)
BASOPHILS ABSOLUTE: 0 K/CU MM
BASOPHILS RELATIVE PERCENT: 0.1 % (ref 0–1)
BUN BLDV-MCNC: 23 MG/DL (ref 6–23)
CALCIUM SERPL-MCNC: 8.5 MG/DL (ref 8.3–10.6)
CHLORIDE BLD-SCNC: 109 MMOL/L (ref 99–110)
CO2: 20 MMOL/L (ref 21–32)
CREAT SERPL-MCNC: 0.9 MG/DL (ref 0.6–1.1)
DIFFERENTIAL TYPE: ABNORMAL
EOSINOPHILS ABSOLUTE: 0 K/CU MM
EOSINOPHILS RELATIVE PERCENT: 0.4 % (ref 0–3)
GFR AFRICAN AMERICAN: >60 ML/MIN/1.73M2
GFR NON-AFRICAN AMERICAN: >60 ML/MIN/1.73M2
GLUCOSE BLD-MCNC: 118 MG/DL (ref 70–99)
HCT VFR BLD CALC: 31.6 % (ref 37–47)
HEMOGLOBIN: 10 GM/DL (ref 12.5–16)
IMMATURE NEUTROPHIL %: 0.7 % (ref 0–0.43)
LYMPHOCYTES ABSOLUTE: 0.7 K/CU MM
LYMPHOCYTES RELATIVE PERCENT: 10 % (ref 24–44)
MCH RBC QN AUTO: 30.7 PG (ref 27–31)
MCHC RBC AUTO-ENTMCNC: 31.6 % (ref 32–36)
MCV RBC AUTO: 96.9 FL (ref 78–100)
MONOCYTES ABSOLUTE: 0.7 K/CU MM
MONOCYTES RELATIVE PERCENT: 10.9 % (ref 0–4)
NUCLEATED RBC %: 0 %
PDW BLD-RTO: 14.6 % (ref 11.7–14.9)
PLATELET # BLD: 239 K/CU MM (ref 140–440)
PMV BLD AUTO: 12.1 FL (ref 7.5–11.1)
POTASSIUM SERPL-SCNC: 3.4 MMOL/L (ref 3.5–5.1)
RBC # BLD: 3.26 M/CU MM (ref 4.2–5.4)
SEGMENTED NEUTROPHILS ABSOLUTE COUNT: 5.2 K/CU MM
SEGMENTED NEUTROPHILS RELATIVE PERCENT: 77.9 % (ref 36–66)
SODIUM BLD-SCNC: 138 MMOL/L (ref 135–145)
TOTAL IMMATURE NEUTOROPHIL: 0.05 K/CU MM
TOTAL NUCLEATED RBC: 0 K/CU MM
WBC # BLD: 6.7 K/CU MM (ref 4–10.5)

## 2020-12-13 PROCEDURE — 99233 SBSQ HOSP IP/OBS HIGH 50: CPT | Performed by: SURGERY

## 2020-12-13 PROCEDURE — C9113 INJ PANTOPRAZOLE SODIUM, VIA: HCPCS | Performed by: FAMILY MEDICINE

## 2020-12-13 PROCEDURE — 6370000000 HC RX 637 (ALT 250 FOR IP): Performed by: INTERNAL MEDICINE

## 2020-12-13 PROCEDURE — 1200000000 HC SEMI PRIVATE

## 2020-12-13 PROCEDURE — 2580000003 HC RX 258: Performed by: INTERNAL MEDICINE

## 2020-12-13 PROCEDURE — 80048 BASIC METABOLIC PNL TOTAL CA: CPT

## 2020-12-13 PROCEDURE — 85025 COMPLETE CBC W/AUTO DIFF WBC: CPT

## 2020-12-13 PROCEDURE — 6360000002 HC RX W HCPCS: Performed by: FAMILY MEDICINE

## 2020-12-13 PROCEDURE — 2500000003 HC RX 250 WO HCPCS: Performed by: INTERNAL MEDICINE

## 2020-12-13 PROCEDURE — 6360000002 HC RX W HCPCS: Performed by: INTERNAL MEDICINE

## 2020-12-13 PROCEDURE — 6370000000 HC RX 637 (ALT 250 FOR IP): Performed by: FAMILY MEDICINE

## 2020-12-13 PROCEDURE — 6370000000 HC RX 637 (ALT 250 FOR IP): Performed by: SURGERY

## 2020-12-13 PROCEDURE — 94761 N-INVAS EAR/PLS OXIMETRY MLT: CPT

## 2020-12-13 RX ORDER — POTASSIUM CHLORIDE 20 MEQ/1
40 TABLET, EXTENDED RELEASE ORAL ONCE
Status: COMPLETED | OUTPATIENT
Start: 2020-12-13 | End: 2020-12-13

## 2020-12-13 RX ORDER — TRAMADOL HYDROCHLORIDE 50 MG/1
50 TABLET ORAL EVERY 6 HOURS PRN
Status: DISCONTINUED | OUTPATIENT
Start: 2020-12-13 | End: 2020-12-14 | Stop reason: HOSPADM

## 2020-12-13 RX ORDER — TRAMADOL HYDROCHLORIDE 50 MG/1
50 TABLET ORAL EVERY 6 HOURS PRN
Qty: 20 TABLET | Refills: 0 | Status: SHIPPED | OUTPATIENT
Start: 2020-12-13 | End: 2020-12-18

## 2020-12-13 RX ADMIN — ONDANSETRON 4 MG: 2 INJECTION INTRAMUSCULAR; INTRAVENOUS at 09:35

## 2020-12-13 RX ADMIN — METRONIDAZOLE 500 MG: 500 INJECTION, SOLUTION INTRAVENOUS at 08:25

## 2020-12-13 RX ADMIN — MAGNESIUM HYDROXIDE 30 ML: 400 SUSPENSION ORAL at 08:25

## 2020-12-13 RX ADMIN — POTASSIUM CHLORIDE 40 MEQ: 1500 TABLET, EXTENDED RELEASE ORAL at 10:49

## 2020-12-13 RX ADMIN — PANTOPRAZOLE SODIUM 40 MG: 40 INJECTION, POWDER, FOR SOLUTION INTRAVENOUS at 08:25

## 2020-12-13 RX ADMIN — NIFEDIPINE 60 MG: 30 TABLET, FILM COATED, EXTENDED RELEASE ORAL at 08:24

## 2020-12-13 RX ADMIN — SODIUM CHLORIDE, PRESERVATIVE FREE 10 ML: 5 INJECTION INTRAVENOUS at 08:25

## 2020-12-13 RX ADMIN — ASPIRIN 81 MG CHEWABLE TABLET 81 MG: 81 TABLET CHEWABLE at 08:24

## 2020-12-13 RX ADMIN — METRONIDAZOLE 500 MG: 500 INJECTION, SOLUTION INTRAVENOUS at 17:23

## 2020-12-13 RX ADMIN — LEVOFLOXACIN 750 MG: 5 INJECTION, SOLUTION INTRAVENOUS at 21:47

## 2020-12-13 RX ADMIN — ZOLPIDEM TARTRATE 5 MG: 5 TABLET ORAL at 21:47

## 2020-12-13 RX ADMIN — PANTOPRAZOLE SODIUM 40 MG: 40 INJECTION, POWDER, FOR SOLUTION INTRAVENOUS at 21:47

## 2020-12-13 RX ADMIN — METRONIDAZOLE 500 MG: 500 INJECTION, SOLUTION INTRAVENOUS at 01:00

## 2020-12-13 RX ADMIN — CARVEDILOL 25 MG: 25 TABLET, FILM COATED ORAL at 08:24

## 2020-12-13 ASSESSMENT — ENCOUNTER SYMPTOMS
ANAL BLEEDING: 0
CONSTIPATION: 0
BACK PAIN: 0
CHOKING: 0
STRIDOR: 0
RECTAL PAIN: 0
APNEA: 0
PHOTOPHOBIA: 0
ABDOMINAL PAIN: 1
EYE REDNESS: 0
EYE ITCHING: 0
COLOR CHANGE: 0
SORE THROAT: 0

## 2020-12-13 NOTE — PROGRESS NOTES
Hospitalist Progress Note      Name:  Tiffanie kAhtar /Age/Sex: 1945  (66 y.o. female)   MRN & CSN:  [id number] & [id number] Admission Date/Time: 2020  8:21 PM   Location:  -A PCP: Cleve Charles MD       Tiffanie Akhtar is a 66 y.o.  female  who presents with Abdominal Pain and Urinary Retention      Assessment and Plan:   Abdominal pain possibly secondary to perforated viscus vs acute diverticulitis  - diet advanced, needs to tolerate.  ambulate and have BM to be discharged  - IVF d/c  - pain mx prn  - CT abd noted  -Surgery consulted, continue with conservative mx since stable  -Serial abdominal examination, does not appear to have acute abd on exam  -Continue with Levaquin and Flagyl     UYEN on ckd 2/3  - stable now  - avoid nephrotoxic medication  - IVF d/c  - monitor I/Os  - nephrology consulted     HypoK  - replaced     COVID 19 neg     H/o Chronic Diastolic CHF   CAD  Hypertension - resume home coreg and nifedipine now  hyperlipidemia  Anemia  Rheumatoid arthritis            DVT ppx: SCDs  Code status: Full code            Diet DIET LOW FIBER; Low Sodium (2 GM)   Code Status Full Code     Medications:   Medications:    magnesium hydroxide  30 mL Oral Daily    potassium chloride  40 mEq Oral Once    pantoprazole  40 mg Intravenous BID    carvedilol  25 mg Oral Daily    NIFEdipine  60 mg Oral Daily    metroNIDAZOLE  500 mg Intravenous Q8H    sodium chloride flush  10 mL Intravenous 2 times per day    aspirin  81 mg Oral Daily    levofloxacin  750 mg Intravenous Q48H      Infusions:   PRN Meds:     traMADol, 50 mg, Q6H PRN      zolpidem, 5 mg, Nightly PRN      melatonin, 9 mg, Nightly PRN      labetalol, 10 mg, Q4H PRN      sodium chloride flush, 10 mL, PRN      acetaminophen, 650 mg, Q6H PRN    Or      acetaminophen, 650 mg, Q6H PRN      promethazine, 12.5 mg, Q6H PRN    Or      ondansetron, 4 mg, Q6H PRN      Subjective:     Doing ok, no distress  Objective: Intake/Output Summary (Last 24 hours) at 12/13/2020 1005  Last data filed at 12/13/2020 5179  Gross per 24 hour   Intake 2109 ml   Output 500 ml   Net 1609 ml      Vitals:   Vitals:    12/13/20 0602   BP: 133/61   Pulse: 77   Resp: 21   Temp:    SpO2: 96%     Physical Exam:   Gen:  awake, alert, no apparent distress  Head/Eyes:  Normocephalic atraumatic, EOMI   NECK:   symmetrical, trachea midline  LUNGS: Normal Effort   CARDIOVASCULAR:  Normal rate  ABDOMEN:  non distended  MUSCULOSKELETAL:  ROM WNL  NEUROLOGIC: Alert and Oriented,  Cranial nerves II-XII are grossly intact.    SKIN:  no bruising or bleeding, normal skin color,  no redness      Data:       CBC   Recent Labs     12/11/20  1551 12/12/20  0546 12/13/20  0600   WBC 8.0 7.7 6.7   HGB 11.1* 11.6* 10.0*   HCT 33.4* 34.9* 31.6*    233 239      BMP   Recent Labs     12/10/20  1338 12/11/20  1551 12/12/20  0546 12/13/20  0600    139 137 138   K 3.0* 4.0 3.9 3.4*    104 104 109   CO2 19* 22 22 20*   PHOS 3.4  --   --   --    BUN 45* 28* 28* 23   CREATININE 2.3* 1.2* 1.1 0.9         Electronically signed by Isamar Gallagher MD on 12/13/2020 at 10:05 AM

## 2020-12-13 NOTE — PROGRESS NOTES
General Surgery- [facility] Day: 5    Chief Complaint on Admission: Acute diverticulitis with microperforation      Subjective:     Oanh Torres is a 66 y.o. female with acute diverticulitis with microperforation. Patient denies abdominal pain. Tolerating DIET GENERAL; Low Sodium (2 GM). - BM.     ROS:  Review of Systems   Constitutional: Negative for chills and fever. HENT: Negative for ear pain, mouth sores, sore throat and tinnitus. Eyes: Negative for photophobia, redness and itching. Respiratory: Negative for apnea, choking and stridor. Cardiovascular: Negative for chest pain and palpitations. Gastrointestinal: Positive for abdominal pain. Negative for anal bleeding, constipation and rectal pain. Endocrine: Negative for polydipsia. Genitourinary: Negative for enuresis, flank pain and hematuria. Musculoskeletal: Negative for back pain, joint swelling and myalgias. Skin: Negative for color change and pallor. Allergic/Immunologic: Negative for environmental allergies. Neurological: Negative for syncope and speech difficulty. Psychiatric/Behavioral: Negative for confusion and hallucinations. Allergies  Pcn [penicillins]          Diagnosis Date    Arthritis     CAD (coronary artery disease) 2018    Chronic kidney disease, stage III (moderate) 2017    H/O Doppler lower venous ultrasound 2019    No DVT or SVT, Significant reflux in RGSV and LGSV    H/O echocardiogram 2018    Limited study to evaluate LV function. Left ventricular size is normal Lv function is normal EF 50-55% EF has improved since last echo.  Hypertension        Objective:     Vitals:    20 0602   BP: 133/61   Pulse: 77   Resp: 21   Temp:    SpO2: 96%       TEMPERATURE:  Current - Temp: 97.8 °F (36.6 °C); Max - Temp  Av.8 °F (36.6 °C)  Min: 97.8 °F (36.6 °C)  Max: 97.9 °F (36.6 °C)    I/O this shift:   In: 384 [I.V.:849; IV Piggyback:100]  Out: - I/O last 3 completed shifts: In: 3100 [P.O.:150; I.V.:2070; IV Piggyback:350]  Out: 1150 [Urine:1050; Emesis/NG output:100]      Physical Exam:  Physical Exam  Constitutional:       General: She is not in acute distress. Neck:      Musculoskeletal: Neck supple. Pulmonary:      Effort: Pulmonary effort is normal.   Abdominal:      Palpations: Abdomen is soft. Skin:     General: Skin is warm. Neurological:      Mental Status: She is alert.              Scheduled Meds:   pantoprazole  40 mg Intravenous BID    carvedilol  25 mg Oral Daily    NIFEdipine  60 mg Oral Daily    metroNIDAZOLE  500 mg Intravenous Q8H    sodium chloride flush  10 mL Intravenous 2 times per day    aspirin  81 mg Oral Daily    levofloxacin  750 mg Intravenous Q48H     Continuous Infusions:   sodium chloride 75 mL/hr at 12/12/20 2255     PRN Meds:zolpidem, melatonin, labetalol, oxyCODONE-acetaminophen, morphine, sodium chloride flush, acetaminophen **OR** acetaminophen, magnesium hydroxide, promethazine **OR** ondansetron      Labs/Imaging Results:   Lab Results   Component Value Date    WBC 7.7 12/12/2020    HGB 11.6 (L) 12/12/2020    HCT 34.9 (L) 12/12/2020    MCV 95.4 12/12/2020     12/12/2020     Lab Results   Component Value Date     12/12/2020    K 3.9 12/12/2020     12/12/2020    CO2 22 12/12/2020    BUN 28 (H) 12/12/2020    CREATININE 1.1 12/12/2020    GLUCOSE 137 (H) 12/12/2020    CALCIUM 9.3 12/12/2020    PROT 6.2 (L) 12/10/2020    LABALBU 3.3 (L) 12/10/2020    BILITOT 0.6 12/10/2020    ALKPHOS 59 12/10/2020    AST 22 12/10/2020    ALT 13 12/10/2020    LABGLOM 48 (L) 12/12/2020    GFRAA 58 (L) 12/12/2020       Assessment:     Patient Active Problem List:     Essential hypertension     Rheumatoid arthritis of multiple sites with negative rheumatoid factor (HCC)     Dysuria     Chronic kidney disease, stage III (moderate)     Acute renal failure with acute cortical necrosis (HCC)     Congestive heart failure ([facility] [address] ASCVD (arteriosclerotic cardiovascular disease)     Anemia     Ankle swelling determined by examination     Dyslipidemia     Bruit of right carotid artery     Abdominal pain      Diverticulitis with microperforation    Plan:     Needs to abulate  Wbc improving  Await bowel function- will give milk of mag  Home once pt has bowel function on po abx      Sana Costello MD

## 2020-12-13 NOTE — PROGRESS NOTES
Nephrology Progress Note  12/13/2020 11:14 AM  Subjective:      Interval History: Christiano Paul is a 66 y.o. female  Doing ok but not want eat      Data:   Scheduled Meds:   magnesium hydroxide  30 mL Oral Daily    pantoprazole  40 mg Intravenous BID    carvedilol  25 mg Oral Daily    NIFEdipine  60 mg Oral Daily    metroNIDAZOLE  500 mg Intravenous Q8H    sodium chloride flush  10 mL Intravenous 2 times per day    aspirin  81 mg Oral Daily    levofloxacin  750 mg Intravenous Q48H     Continuous Infusions:          CBC:   Recent Labs     12/11/20  1551 12/12/20  0546 12/13/20  0600   WBC 8.0 7.7 6.7   HGB 11.1* 11.6* 10.0*    233 239     BMP:    Recent Labs     12/11/20  1551 12/12/20  0546 12/13/20  0600    137 138   K 4.0 3.9 3.4*    104 109   CO2 22 22 20*   BUN 28* 28* 23   CREATININE 1.2* 1.1 0.9   GLUCOSE 131* 137* 118*       Renal Labs  Albumin:    Lab Results   Component Value Date    LABALBU 3.3 12/10/2020     Calcium:    Lab Results   Component Value Date    CALCIUM 8.5 12/13/2020     Phosphorus:    Lab Results   Component Value Date    PHOS 3.4 12/10/2020     U/A:    Lab Results   Component Value Date    NITRU NEGATIVE 12/09/2020    NITRU Negative 02/18/2020    COLORU JOVANNY 12/09/2020    PHUR 5.0 02/18/2020    LABCAST CANCELED 10/22/2018    LABCAST CANCELED 10/22/2018    WBCUA 4 12/09/2020    RBCUA NONE SEEN 12/09/2020    MUCUS RARE 12/09/2020    TRICHOMONAS NONE SEEN 12/09/2020    YEAST CANCELED 10/22/2018    BACTERIA NEGATIVE 12/09/2020    CLARITYU SLIGHTLY CLOUDY 12/09/2020    SPECGRAV 1.019 12/09/2020    UROBILINOGEN 1 12/09/2020    BILIRUBINUR SMALL 12/09/2020    BLOODU NEGATIVE 12/09/2020    GLUCOSEU Negative 02/18/2020    KETUA NEGATIVE 12/09/2020           Objective:   I/O: 12/12 0701 - 12/13 0700  In: 1949 [I.V.:1749]  Out: 500 [Urine:500]  Vitals: BP (!) 134/53   Pulse 57   Temp 98.2 °F (36.8 °C) (Oral)   Resp 17   Ht 5' 1\" (1.549 m)   Wt 179 lb 14.3 oz (81.6 kg)   SpO2 96%   BMI 33.99 kg/m²   General appearance: awake weak  HEENT: Head: Normal, normocephalic, atraumatic.   Neck: supple, symmetrical, trachea midline  Lungs: diminished breath sounds bilaterally  Heart: S1, S2 normal  Abdomen: abnormal findings:  soft  Mild tender  Extremities: edema trace  Neurologic: Mental status: alertness: alert        Assessment and Plan:      IMP:  arf from atn on ckd 3  Perforated viscus  Cad  htn  Low K    Plan     Renal stable mnitor  Not plan surgery fu oral intake  Cardiac monitor  bp stable  K replete  If take po ok to dc from renal           Raul Harris MD

## 2020-12-13 NOTE — PROGRESS NOTES
12/13/20 0750   Oxygen Therapy/Pulse Ox   O2 Therapy Room air   Pulse Oximeter Device Mode Continuous   Pulse Oximeter Device Location Finger   $Pulse Oximeter $Spot check (multiple/continuous)

## 2020-12-14 VITALS
DIASTOLIC BLOOD PRESSURE: 74 MMHG | BODY MASS INDEX: 33.96 KG/M2 | OXYGEN SATURATION: 98 % | TEMPERATURE: 98.3 F | HEIGHT: 61 IN | RESPIRATION RATE: 18 BRPM | HEART RATE: 80 BPM | SYSTOLIC BLOOD PRESSURE: 178 MMHG | WEIGHT: 179.9 LBS

## 2020-12-14 PROCEDURE — 94761 N-INVAS EAR/PLS OXIMETRY MLT: CPT

## 2020-12-14 PROCEDURE — 2580000003 HC RX 258: Performed by: INTERNAL MEDICINE

## 2020-12-14 PROCEDURE — 6370000000 HC RX 637 (ALT 250 FOR IP): Performed by: FAMILY MEDICINE

## 2020-12-14 PROCEDURE — 6370000000 HC RX 637 (ALT 250 FOR IP): Performed by: SURGERY

## 2020-12-14 PROCEDURE — C9113 INJ PANTOPRAZOLE SODIUM, VIA: HCPCS | Performed by: FAMILY MEDICINE

## 2020-12-14 PROCEDURE — 2500000003 HC RX 250 WO HCPCS: Performed by: INTERNAL MEDICINE

## 2020-12-14 PROCEDURE — 6370000000 HC RX 637 (ALT 250 FOR IP): Performed by: INTERNAL MEDICINE

## 2020-12-14 PROCEDURE — 6360000002 HC RX W HCPCS: Performed by: FAMILY MEDICINE

## 2020-12-14 RX ORDER — PANTOPRAZOLE SODIUM 40 MG/1
40 TABLET, DELAYED RELEASE ORAL
Qty: 90 TABLET | Refills: 1 | Status: SHIPPED | OUTPATIENT
Start: 2020-12-14 | End: 2021-08-31

## 2020-12-14 RX ORDER — NIFEDIPINE 60 MG/1
60 TABLET, FILM COATED, EXTENDED RELEASE ORAL DAILY
Qty: 30 TABLET | Refills: 3 | Status: SHIPPED | OUTPATIENT
Start: 2020-12-15

## 2020-12-14 RX ORDER — LEVOFLOXACIN 500 MG/1
500 TABLET, FILM COATED ORAL DAILY
Qty: 7 TABLET | Refills: 0 | Status: SHIPPED | OUTPATIENT
Start: 2020-12-14 | End: 2020-12-21

## 2020-12-14 RX ORDER — ASPIRIN 81 MG/1
81 TABLET, CHEWABLE ORAL DAILY
Qty: 30 TABLET | Refills: 3 | Status: SHIPPED | OUTPATIENT
Start: 2020-12-15 | End: 2021-01-04

## 2020-12-14 RX ORDER — LOSARTAN POTASSIUM 50 MG/1
50 TABLET ORAL DAILY
Qty: 90 TABLET | Refills: 1 | Status: SHIPPED | OUTPATIENT
Start: 2020-12-14 | End: 2022-01-11

## 2020-12-14 RX ORDER — METRONIDAZOLE 250 MG/1
250 TABLET ORAL 3 TIMES DAILY
Qty: 21 TABLET | Refills: 0 | Status: SHIPPED | OUTPATIENT
Start: 2020-12-14 | End: 2020-12-21

## 2020-12-14 RX ORDER — CHLORTHALIDONE 25 MG/1
25 TABLET ORAL DAILY
Qty: 30 TABLET | Refills: 3 | Status: SHIPPED | OUTPATIENT
Start: 2020-12-14

## 2020-12-14 RX ADMIN — SODIUM CHLORIDE, PRESERVATIVE FREE 10 ML: 5 INJECTION INTRAVENOUS at 09:25

## 2020-12-14 RX ADMIN — PANTOPRAZOLE SODIUM 40 MG: 40 INJECTION, POWDER, FOR SOLUTION INTRAVENOUS at 09:22

## 2020-12-14 RX ADMIN — METRONIDAZOLE 500 MG: 500 INJECTION, SOLUTION INTRAVENOUS at 01:46

## 2020-12-14 RX ADMIN — CARVEDILOL 25 MG: 25 TABLET, FILM COATED ORAL at 09:20

## 2020-12-14 RX ADMIN — NIFEDIPINE 60 MG: 30 TABLET, FILM COATED, EXTENDED RELEASE ORAL at 09:21

## 2020-12-14 RX ADMIN — METRONIDAZOLE 500 MG: 500 INJECTION, SOLUTION INTRAVENOUS at 09:24

## 2020-12-14 RX ADMIN — MAGNESIUM HYDROXIDE 30 ML: 400 SUSPENSION ORAL at 09:20

## 2020-12-14 RX ADMIN — ASPIRIN 81 MG CHEWABLE TABLET 81 MG: 81 TABLET CHEWABLE at 09:20

## 2020-12-14 NOTE — DISCHARGE SUMMARY
aH Law 1945 [id number]  PCP:  Mary Mills MD    Admit date: 12/9/2020  Admitting Physician: Mich Pascual MD    Discharge date: 12/14/2020 Discharge Physician: Renay Francois MD         Hospital Course and Discharge Diagnoses Include:    Abdominal pain possibly secondary to perforated viscus vs acute diverticulitis  - diet advanced and had BM  - IVF d/c  - pain mx prn  - CT abd noted  -Surgery consulted, continue with conservative mx since stable  -Serial abdominal examination, does not appear to have acute abd on exam  -Continue with Levaquin and Flagyl     UYEN on ckd 2/3  - stable now  - avoid nephrotoxic medication  - IVF d/c  - monitor I/Os  - nephrology consulted, okay to resume losartan and chlorthalidone now, will reduce doses by half.     HypoK  - replaced     COVID 19 neg     H/o Chronic Diastolic CHF   CAD  Hypertension - resume home meds  hyperlipidemia  Anemia  Rheumatoid arthritis            DVT ppx: SCDs  Code status: Full code        Physical Exam on Discharge date: 12/14/20  Gen:  awake, alert, no apparent distress  Head/Eyes:  Normocephalic atraumatic, EOMI   NECK:   symmetrical, trachea midline  LUNGS: Normal Effort   CARDIOVASCULAR:  Normal rate  ABDOMEN:  non distended  MUSCULOSKELETAL:  ROM WNL  NEUROLOGIC: Alert and Oriented,  Cranial nerves II-XII are grossly intact.    SKIN:  no bruising or bleeding, normal skin color,  no redness    Procedures:  See above  Echo Complete 2d W Doppler W Color    Result Date: 12/10/2020  Transthoracic Echocardiography Report (TTE)  Demographics   Patient Name       Clarita SPEARS Date of Study       12/10/2020   Date of Birth      1945         Gender              Female   Age                66 year(s)         Race                   Patient Number     [id number]         Room Number         2111   Visit Number       [id number]   Corporate ID       [id number]   Accession Number   [id number]         [id number] Diastolic: 4.95 cm ml  LV PW Systolic: 3.64 cm  LV Volume Systolic: 23  Septum Diastolic: 3.48   ml  cm                       LV EDV/LV EDV Index: 74 RV Diastolic Dimension:  Septum Systolic: 9.17 cm LE/05 M4PL ESV/LV ESV   2.49 cm  CO: 7.27 l/min           Index: 23 ml/13 m2  CI: 4.02 l/m*m2          EF Calculated (A4C):    LA/Aorta: 1.48                           68.9 %  LV Area Diastolic: 26.9  EF Calculated (2D):     LA volume/Index: 81 ml  cm2                      57.4 %                  /92U8  LV Area Systolic: 94.2  cm2                      LV Length: 7.84 cm                            LVOT: 2 cm  Doppler Measurements & Calculations   MV Peak E-Wave: 97.2    AV Peak Velocity: 155 cm/s   LVOT Peak Velocity: 121  cm/s                    AV Peak Gradient: 9.61 mmHg  cm/s  MV Peak A-Wave: 113     AV Mean Velocity: 109 cm/s   LVOT Mean Velocity: 85  cm/s                    AV Mean Gradient: 5 mmHg     cm/s  MV E/A Ratio: 0.86      AV VTI: 36.8 cm              LVOT Peak Gradient: 6  MV Peak Gradient: 3.78  AV Area (Continuity):2.5 cm2 mmHgLVOT Mean Gradient:  mmHg                                                 3 mmHg                          LVOT VTI: 29.3 cm  MV P1/2t: 52 msec       AV P1/2t: 327 msec  MVA by PHT:4.23 cm2   MV E' Septal Velocity:  8.01 cm/s  MV E' Lateral Velocity:  10.6 cm/s  MV E/E' septal: 12.13  MV E/E' lateral: 9.17      Ct Abdomen Pelvis Wo Contrast Additional Contrast? None    Result Date: 12/9/2020  EXAMINATION: CT OF THE ABDOMEN AND PELVIS WITHOUT CONTRAST 12/9/2020 8:51 pm TECHNIQUE: CT of the abdomen and pelvis was performed without the administration of intravenous contrast. Multiplanar reformatted images are provided for review. Dose modulation, iterative reconstruction, and/or weight based adjustment of the mA/kV was utilized to reduce the radiation dose to as low as reasonably achievable.  COMPARISON: KUB December 9, 2020; CT abdomen pelvis July 21, 2017 HISTORY: [address] pelvis. No organized fluid collection identified. Bones/Soft Tissues: No acute osseous or soft tissue abnormality identified. 1. Small amount of free air present within the abdomen which is most consistent with perforated viscus. The exact origin of the free air is indeterminate, however, the patient does have diverticulosis with stranding noted throughout the pelvis and acute diverticulitis with perforation is favored. No well-defined organized fluid identified at this time. A small amount of free fluid is present within the pelvis. 2. Dilated fluid-filled loops of small bowel also present throughout the abdomen without well-defined transition point identified. There is tapering of the small bowel distally and findings favored to reflect ileus, however, partial small bowel obstruction cannot entirely be excluded. Recommend interval follow-up to ensure resolution. Critical results were called by Dr. Carl Dan MD to Dr. Nicky Yoder [facility] on 12/9/2020 at 21:42. Xr Abdomen (kub) (single Ap View)    Result Date: 12/9/2020  EXAMINATION: ONE SUPINE XRAY VIEW(S) OF THE ABDOMEN 12/9/2020 4:29 pm COMPARISON: None. HISTORY: ORDERING SYSTEM PROVIDED HISTORY: constipation TECHNOLOGIST PROVIDED HISTORY: Reason for exam:->constipation Reason for Exam: constipation Acuity: Acute Type of Exam: Initial Additional signs and symptoms: pain Relevant Medical/Surgical History: none FINDINGS: Gas seen in dilated small bowel. No significant colonic gas. Calcifications centrally in the pelvis may be related to uterine fibroids.      Gas seen in dilated small bowel centrally in the abdomen suggesting a small bowel obstruction       Significant Diagnostic Studies at discharge:   CBC:   Lab Results   Component Value Date    WBC 6.7 12/13/2020    RBC 3.26 12/13/2020    HGB 10.0 12/13/2020    HCT 31.6 12/13/2020    MCV 96.9 12/13/2020    MCH 30.7 12/13/2020    MCHC 31.6 12/13/2020    RDW 14.6 12/13/2020     12/13/2020 MPV 12.1 12/13/2020       Patient Instructions:     Medication List      START taking these medications    aspirin 81 MG chewable tablet  Take 1 tablet by mouth daily  Start taking on: December 15, 2020     levoFLOXacin 500 MG tablet  Commonly known as: LEVAQUIN  Take 1 tablet by mouth daily for 7 days     metroNIDAZOLE 250 MG tablet  Commonly known as: FLAGYL  Take 1 tablet by mouth 3 times daily for 7 days     pantoprazole 40 MG tablet  Commonly known as: PROTONIX  Take 1 tablet by mouth every morning (before breakfast)     traMADol 50 MG tablet  Commonly known as: Ultram  Take 1 tablet by mouth every 6 hours as needed for Pain for up to 5 days. Intended supply: 3 days. Take lowest dose possible to manage pain        CHANGE how you take these medications    chlorthalidone 25 MG tablet  Commonly known as: HYGROTON  Take 1 tablet by mouth daily  What changed:   · medication strength  · how much to take     losartan 50 MG tablet  Commonly known as: COZAAR  Take 1 tablet by mouth daily  What changed:   · medication strength  · how much to take     NIFEdipine 60 MG extended release tablet  Commonly known as: ADALAT CC  Take 1 tablet by mouth daily  Start taking on: December 15, 2020  What changed:   · medication strength  · how much to take        CONTINUE taking these medications    atorvastatin 80 MG tablet  Commonly known as: LIPITOR  Take 1 tablet by mouth nightly     Calcium Antacid 500 MG chewable tablet  Generic drug: calcium carbonate     carvedilol 25 MG tablet  Commonly known as: COREG  Take 1 tablet by mouth daily     clopidogrel 75 MG tablet  Commonly known as: PLAVIX  Take 1 tablet by mouth daily     Compression Stockings Misc  by Does not apply route 15 to 20 mmHG     nitroGLYCERIN 0.4 MG SL tablet  Commonly known as: NITROSTAT  up to max of 3 total doses. If no relief after 1 dose, call 911.      Plaquenil 200 MG tablet  Generic drug: hydroxychloroquine           Where to Get Your Medications      These medications were sent to [facility] ([place]), [place] - [address]  [address] [place] [address]    Phone: [phone]   · aspirin 81 MG chewable tablet  · chlorthalidone 25 MG tablet  · levoFLOXacin 500 MG tablet  · losartan 50 MG tablet  · metroNIDAZOLE 250 MG tablet  · NIFEdipine 60 MG extended release tablet  · pantoprazole 40 MG tablet     You can get these medications from any pharmacy    Bring a paper prescription for each of these medications  · traMADol 50 MG tablet            Code Status: Full Code     Consults:   IP CONSULT TO NEPHROLOGY  IP CONSULT TO GENERAL SURGERY  IP CONSULT TO HOSPITALIST  IP CONSULT TO CARDIOLOGY  IP CONSULT TO GENERAL SURGERY    Diet: regular diet    Activity: activity as tolerated   Work:    Discharged Condition: good    Prognosis: Fair - Good    Disposition: home      Follow-up with   1. PCP within   5-7    Days    Follow up labs: none       Discharge Physician Signed: Patrizia Macedo M.D. The patient was seen and examined on day of discharge and this discharge summary is in conjunction with any daily progress note from day of discharge.   Time spent on discharge in the examination, evaluation, counseling and review of medications and discharge plan: 34 minutes

## 2020-12-14 NOTE — PROGRESS NOTES
Nephrology Progress Note  12/14/2020 10:08 AM  Subjective:      Interval History: Ha Law is a 66 y.o. female   Trying eat today      Data:   Scheduled Meds:   magnesium hydroxide  30 mL Oral Daily    pantoprazole  40 mg Intravenous BID    carvedilol  25 mg Oral Daily    NIFEdipine  60 mg Oral Daily    metroNIDAZOLE  500 mg Intravenous Q8H    sodium chloride flush  10 mL Intravenous 2 times per day    aspirin  81 mg Oral Daily    levofloxacin  750 mg Intravenous Q48H     Continuous Infusions:          CBC:   Recent Labs     12/11/20  1551 12/12/20  0546 12/13/20  0600   WBC 8.0 7.7 6.7   HGB 11.1* 11.6* 10.0*    233 239     BMP:    Recent Labs     12/11/20  1551 12/12/20  0546 12/13/20  0600    137 138   K 4.0 3.9 3.4*    104 109   CO2 22 22 20*   BUN 28* 28* 23   CREATININE 1.2* 1.1 0.9   GLUCOSE 131* 137* 118*       Renal Labs  Albumin:    Lab Results   Component Value Date    LABALBU 3.3 12/10/2020     Calcium:    Lab Results   Component Value Date    CALCIUM 8.5 12/13/2020     Phosphorus:    Lab Results   Component Value Date    PHOS 3.4 12/10/2020     U/A:    Lab Results   Component Value Date    NITRU NEGATIVE 12/09/2020    NITRU Negative 02/18/2020    COLORU JOVANNY 12/09/2020    PHUR 5.0 02/18/2020    LABCAST CANCELED 10/22/2018    LABCAST CANCELED 10/22/2018    WBCUA 4 12/09/2020    RBCUA NONE SEEN 12/09/2020    MUCUS RARE 12/09/2020    TRICHOMONAS NONE SEEN 12/09/2020    YEAST CANCELED 10/22/2018    BACTERIA NEGATIVE 12/09/2020    CLARITYU SLIGHTLY CLOUDY 12/09/2020    SPECGRAV 1.019 12/09/2020    UROBILINOGEN 1 12/09/2020    BILIRUBINUR SMALL 12/09/2020    BLOODU NEGATIVE 12/09/2020    GLUCOSEU Negative 02/18/2020    KETUA NEGATIVE 12/09/2020           Objective:   I/O: 12/13 0701 - 12/14 0700  In: 160 [I.V.:160]  Out: 690 [Urine:650]  Vitals: BP (!) 178/74   Pulse 80   Temp 98.3 °F (36.8 °C) (Oral)   Resp 18   Ht 5' 1\" (1.549 m)   Wt 179 lb 14.3 oz (81.6 kg) SpO2 98%   BMI 33.99 kg/m²   General appearance: awake weak  HEENT: Head: Normal, normocephalic, atraumatic.   Neck: supple, symmetrical, trachea midline  Lungs: diminished breath sounds bilaterally  Heart: S1, S2 normal  Abdomen: abnormal findings:  soft  Mild tender  Extremities: edema trace  Neurologic: Mental status: alertness: alert        Assessment and Plan:      IMP:  arf from atn on ckd 3  Perforated viscus  Cad  htn  Low K    Plan     Renal to baseline  Can restart losartan and chlorthalidone  Monitor K as restart meds  Taking po  Cardiac stable  bp monitor as restart home meds  Can work on [employer], MD

## 2020-12-15 LAB
EKG ATRIAL RATE: 74 BPM
EKG DIAGNOSIS: NORMAL
EKG P AXIS: 32 DEGREES
EKG P-R INTERVAL: 174 MS
EKG Q-T INTERVAL: 380 MS
EKG QRS DURATION: 88 MS
EKG QTC CALCULATION (BAZETT): 421 MS
EKG R AXIS: -20 DEGREES
EKG T AXIS: 53 DEGREES
EKG VENTRICULAR RATE: 74 BPM

## 2021-01-07 ENCOUNTER — OFFICE VISIT (OUTPATIENT)
Dept: SURGERY | Age: 76
End: 2021-01-07
Payer: MEDICARE

## 2021-01-07 VITALS
BODY MASS INDEX: 31.96 KG/M2 | HEIGHT: 62 IN | HEART RATE: 67 BPM | SYSTOLIC BLOOD PRESSURE: 130 MMHG | WEIGHT: 173.7 LBS | TEMPERATURE: 97.5 F | DIASTOLIC BLOOD PRESSURE: 66 MMHG | OXYGEN SATURATION: 98 % | RESPIRATION RATE: 14 BRPM

## 2021-01-07 DIAGNOSIS — K57.92 DIVERTICULITIS: Primary | ICD-10-CM

## 2021-01-07 PROCEDURE — 1123F ACP DISCUSS/DSCN MKR DOCD: CPT | Performed by: SURGERY

## 2021-01-07 PROCEDURE — 4040F PNEUMOC VAC/ADMIN/RCVD: CPT | Performed by: SURGERY

## 2021-01-07 PROCEDURE — 1111F DSCHRG MED/CURRENT MED MERGE: CPT | Performed by: SURGERY

## 2021-01-07 PROCEDURE — 99213 OFFICE O/P EST LOW 20 MIN: CPT | Performed by: SURGERY

## 2021-01-07 PROCEDURE — G8484 FLU IMMUNIZE NO ADMIN: HCPCS | Performed by: SURGERY

## 2021-01-07 PROCEDURE — 1036F TOBACCO NON-USER: CPT | Performed by: SURGERY

## 2021-01-07 PROCEDURE — G8399 PT W/DXA RESULTS DOCUMENT: HCPCS | Performed by: SURGERY

## 2021-01-07 PROCEDURE — G8427 DOCREV CUR MEDS BY ELIG CLIN: HCPCS | Performed by: SURGERY

## 2021-01-07 PROCEDURE — 1090F PRES/ABSN URINE INCON ASSESS: CPT | Performed by: SURGERY

## 2021-01-07 PROCEDURE — G8417 CALC BMI ABV UP PARAM F/U: HCPCS | Performed by: SURGERY

## 2021-01-18 ENCOUNTER — PROCEDURE VISIT (OUTPATIENT)
Dept: CARDIOLOGY CLINIC | Age: 76
End: 2021-01-18
Payer: MEDICARE

## 2021-01-18 ENCOUNTER — TELEPHONE (OUTPATIENT)
Dept: CARDIOLOGY CLINIC | Age: 76
End: 2021-01-18

## 2021-01-18 DIAGNOSIS — N18.32 STAGE 3B CHRONIC KIDNEY DISEASE (HCC): ICD-10-CM

## 2021-01-18 DIAGNOSIS — I10 ESSENTIAL HYPERTENSION: ICD-10-CM

## 2021-01-18 DIAGNOSIS — I50.32 CHRONIC DIASTOLIC CONGESTIVE HEART FAILURE (HCC): ICD-10-CM

## 2021-01-18 DIAGNOSIS — R09.89 BRUIT OF RIGHT CAROTID ARTERY: Primary | ICD-10-CM

## 2021-01-18 DIAGNOSIS — I25.10 ASCVD (ARTERIOSCLEROTIC CARDIOVASCULAR DISEASE): ICD-10-CM

## 2021-01-18 DIAGNOSIS — E78.5 DYSLIPIDEMIA: ICD-10-CM

## 2021-01-18 PROCEDURE — 93880 EXTRACRANIAL BILAT STUDY: CPT | Performed by: INTERNAL MEDICINE

## 2021-01-18 RX ORDER — CARVEDILOL 25 MG/1
25 TABLET ORAL 2 TIMES DAILY WITH MEALS
Qty: 90 TABLET | Refills: 3 | Status: SHIPPED | OUTPATIENT
Start: 2021-01-18 | End: 2021-01-18 | Stop reason: SDUPTHER

## 2021-01-18 RX ORDER — CARVEDILOL 25 MG/1
25 TABLET ORAL DAILY
Qty: 90 TABLET | Refills: 3 | Status: SHIPPED | OUTPATIENT
Start: 2021-01-18 | End: 2022-07-25

## 2021-01-18 NOTE — TELEPHONE ENCOUNTER
Pt is calling due to being in hosp, when d/c was put on low does ASA wants to know if this is ok due to being palvix please advise

## 2021-01-20 ENCOUNTER — TELEPHONE (OUTPATIENT)
Dept: CARDIOLOGY CLINIC | Age: 76
End: 2021-01-20

## 2021-01-20 ASSESSMENT — ENCOUNTER SYMPTOMS
EYE ITCHING: 0
CONSTIPATION: 1
BACK PAIN: 0
PHOTOPHOBIA: 0
COLOR CHANGE: 0
CHOKING: 0
ANAL BLEEDING: 0
RECTAL PAIN: 0
SORE THROAT: 0
APNEA: 0
EYE REDNESS: 0
ABDOMINAL PAIN: 1
STRIDOR: 0

## 2021-02-23 ENCOUNTER — HOSPITAL ENCOUNTER (EMERGENCY)
Age: 76
Discharge: HOME | End: 2021-02-23
Payer: MEDICARE

## 2021-02-23 VITALS
HEART RATE: 60 BPM | TEMPERATURE: 98 F | OXYGEN SATURATION: 100 % | RESPIRATION RATE: 18 BRPM | DIASTOLIC BLOOD PRESSURE: 67 MMHG | SYSTOLIC BLOOD PRESSURE: 150 MMHG

## 2021-02-23 DIAGNOSIS — I10 HYPERTENSION, UNSPECIFIED TYPE: Primary | ICD-10-CM

## 2021-02-23 PROCEDURE — 99283 EMERGENCY DEPT VISIT LOW MDM: CPT

## 2021-02-23 NOTE — ED PROVIDER NOTES
EMERGENCY DEPARTMENT ENCOUNTER      PCP: Wilkie Osler, MD    CHIEF COMPLAINT    Chief Complaint   Patient presents with    Hypertension     pt was at her podiatrist and was sent to ER fir HTN; pt reports hx of HTN and takes medication           This patient was not evaluated by the attending physician. I have independently evaluated this patient. HPI    Lamine Fernandez is a 66 y.o. female who presents with elevated blood pressure. Onset this morning. Context is patient was at podiatrist office and was found to have systolic blood pressure of 220. She was sent to the emergency department for evaluation. Patient states she had not yet taken her blood pressure medication. In transit from podiatry office until arrival at ED, she states she took both of her blood pressure medications. She denies ever having any symptoms. REVIEW OF SYSTEMS    Constitutional:  Denies fever, chills, weight loss or weakness   HENT:  Denies sore throat or ear pain   Cardiovascular:  Denies chest pain, palpitations   Respiratory:  Denies cough or shortness of breath    GI:  Denies abdominal pain, nausea, vomiting, or diarrhea  :  Denies any urinary symptoms    Musculoskeletal:  Denies back pain.   Has ingrown toenails for which she follows with podiatrist.  Skin:  Denies rash  Neurologic:  Denies headache, focal weakness or sensory changes   Endocrine:  Denies polyuria or polydypsia   Lymphatic:  Denies swollen glands     All other review of systems are negative  See HPI and nursing notes for additional information     PAST MEDICAL AND SURGICAL HISTORY    Past Medical History:   Diagnosis Date    Arthritis     CAD (coronary artery disease) 04/22/2018    carotid 01/18/2021    mild 0-49% disease of the bilateral proximal internal carotid artery    Chronic kidney disease, stage III (moderate) 08/29/2017    H/O Doppler lower venous ultrasound 03/27/2019    No DVT or SVT, Significant reflux in RGSV and LGSV    H/O echocardiogram 06/11/2018    Limited study to evaluate LV function. Left ventricular size is normal Lv function is normal EF 50-55% EF has improved since last echo.  Hypertension      History reviewed. No pertinent surgical history. CURRENT MEDICATIONS    Current Outpatient Rx   Medication Sig Dispense Refill    carvedilol (COREG) 25 MG tablet Take 1 tablet by mouth daily 90 tablet 3    ferrous sulfate (IRON 325) 325 (65 Fe) MG tablet Take 325 mg by mouth daily      triamterene-hydroCHLOROthiazide (DYAZIDE) 37.5-25 MG per capsule Take 1 capsule by mouth daily      NIFEdipine (ADALAT CC) 60 MG extended release tablet Take 1 tablet by mouth daily 30 tablet 3    losartan (COZAAR) 50 MG tablet Take 1 tablet by mouth daily 90 tablet 1    clopidogrel (PLAVIX) 75 MG tablet Take 1 tablet by mouth daily 90 tablet 3    nitroGLYCERIN (NITROSTAT) 0.4 MG SL tablet up to max of 3 total doses.  If no relief after 1 dose, call 911. 25 tablet 2    hydroxychloroquine (PLAQUENIL) 200 MG tablet Take 200 mg by mouth daily      calcium carbonate (CALCIUM ANTACID) 500 MG chewable tablet Take 1 tablet by mouth daily       pantoprazole (PROTONIX) 40 MG tablet Take 1 tablet by mouth every morning (before breakfast) 90 tablet 1    chlorthalidone (HYGROTON) 25 MG tablet Take 1 tablet by mouth daily 30 tablet 3    Compression Stockings MISC by Does not apply route 15 to 20 mmHG 1 each 0       ALLERGIES    Allergies   Allergen Reactions    Pcn [Penicillins]        SOCIAL AND FAMILY HISTORY    Social History     Socioeconomic History    Marital status: Single     Spouse name: None    Number of children: None    Years of education: None    Highest education level: None   Occupational History    None   Social Needs    Financial resource strain: None    Food insecurity     Worry: None     Inability: None    Transportation needs     Medical: None     Non-medical: None   Tobacco Use    Smoking status: Never Smoker    Smokeless tobacco: Never Used   Substance and Sexual Activity    Alcohol use: No     Comment: 1 cup of coffee daily    Drug use: No    Sexual activity: None   Lifestyle    Physical activity     Days per week: None     Minutes per session: None    Stress: None   Relationships    Social connections     Talks on phone: None     Gets together: None     Attends Scientology service: None     Active member of club or organization: None     Attends meetings of clubs or organizations: None     Relationship status: None    Intimate partner violence     Fear of current or ex partner: None     Emotionally abused: None     Physically abused: None     Forced sexual activity: None   Other Topics Concern    None   Social History Narrative    None     Family History   Problem Relation Age of Onset    Cancer Mother     Diabetes Mother     Coronary Art Dis Father     Heart Attack Father     Heart Disease Father     Coronary Art Dis Sister     Heart Attack Sister     Heart Disease Sister     Stroke Sister     High Blood Pressure Brother     High Cholesterol Brother     Kidney Disease Brother          PHYSICAL EXAM    VITAL SIGNS: BP (!) 150/67   Pulse 60   Temp 98 °F (36.7 °C) (Oral)   Resp 18   SpO2 100%    Constitutional:  Well developed, Well nourished. No distress  HENT:  Normocephalic, Atraumatic, PERRL. EOMI. Sclera clear. Conjunctiva normal, No discharge. Neck/Lymphatics: supple, no JVD, no swollen nodes  Cardiovascular:   RRR,  no murmurs/rubs/gallops. No JVD  No carotid bruits or murmurs heard in carotids. Respiratory:  Nonlabored breathing. Normal breath sounds, No wheezing  Abdomen: Bowel sounds normal, Soft, No tenderness, no masses. Musculoskeletal:    There is no edema, asymmetry, or calf / thigh tenderness bilaterally. No cyanosis. No cool or pale-appearing limb.   Distal cap refill and pulses intact bilateral upper and lower extremities  Bilateral upper and lower extremity ROM intact without pain or obvious deficit  Integument:  Warm, Dry  Neurologic: Alert & oriented , No focal deficits noted. Cranial nerves II through XII grossly intact. Normal gross motor coordination & motor strength bilateral upper and lower extremities  Sensation intact. Psychiatric:  Affect normal, Mood normal.         ED COURSE & MEDICAL DECISION MAKING       Patient presents as above with elevated blood pressure today prior to arrival.  She did take her prescribed BP medication and blood pressure markedly improved. She still is high regarding chronic blood pressure management, however this is to be addressed on outpatient basis at PCP-I discussed this with patient today that emergent BP lowering medication is not indicated at this time given that patient's blood pressure is 150/67 and she is asymptomatic. I did offer EKG, labs today and she elects to hold. She states she will discuss with her PCP on outpatient basis. Of note, I verbally reviewed this tx plan with Dr. Papa Pascual today who agrees, no ED workup necessary at this time. Patient agrees to return emergency department if blood pressure raises to equal to or greater than 073 systolic and or 047 diastolic or if patient develops any symptoms. Clinical  IMPRESSION    1. Hypertension, unspecified type              Comment: Please note this report has been produced using speech recognition software and may contain errors related to that system including errors in grammar, punctuation, and spelling, as well as words and phrases that may be inappropriate. If there are any questions or concerns please feel free to contact the dictating provider for clarification.          Prasanth Han, [place]  02/23/21 [id number]

## 2021-02-23 NOTE — ED NOTES
Pt states she had not taken her BP med when she was at the podiatrist. Pt has taken her BP meds as of now.       Sonam Davis RN  02/23/21 [id number]

## 2021-02-23 NOTE — ED NOTES
Discharge instructions given to pt. Pt is alert and orientated x4.         Rico Landa, RN  02/23/21 4709

## 2021-03-04 ENCOUNTER — OFFICE VISIT (OUTPATIENT)
Dept: SURGERY | Age: 76
End: 2021-03-04
Payer: MEDICARE

## 2021-03-04 VITALS
WEIGHT: 170.2 LBS | OXYGEN SATURATION: 98 % | BODY MASS INDEX: 31.13 KG/M2 | DIASTOLIC BLOOD PRESSURE: 66 MMHG | SYSTOLIC BLOOD PRESSURE: 126 MMHG | HEART RATE: 66 BPM

## 2021-03-04 DIAGNOSIS — K57.92 DIVERTICULITIS: Primary | ICD-10-CM

## 2021-03-04 PROCEDURE — 1036F TOBACCO NON-USER: CPT | Performed by: SURGERY

## 2021-03-04 PROCEDURE — G8399 PT W/DXA RESULTS DOCUMENT: HCPCS | Performed by: SURGERY

## 2021-03-04 PROCEDURE — G8417 CALC BMI ABV UP PARAM F/U: HCPCS | Performed by: SURGERY

## 2021-03-04 PROCEDURE — 1090F PRES/ABSN URINE INCON ASSESS: CPT | Performed by: SURGERY

## 2021-03-04 PROCEDURE — G8484 FLU IMMUNIZE NO ADMIN: HCPCS | Performed by: SURGERY

## 2021-03-04 PROCEDURE — G8427 DOCREV CUR MEDS BY ELIG CLIN: HCPCS | Performed by: SURGERY

## 2021-03-04 PROCEDURE — 99213 OFFICE O/P EST LOW 20 MIN: CPT | Performed by: SURGERY

## 2021-03-04 PROCEDURE — 1123F ACP DISCUSS/DSCN MKR DOCD: CPT | Performed by: SURGERY

## 2021-03-04 PROCEDURE — 4040F PNEUMOC VAC/ADMIN/RCVD: CPT | Performed by: SURGERY

## 2021-03-04 ASSESSMENT — ENCOUNTER SYMPTOMS
STRIDOR: 0
CHOKING: 0
SORE THROAT: 0
ANAL BLEEDING: 0
PHOTOPHOBIA: 0
CONSTIPATION: 1
ABDOMINAL PAIN: 1
RECTAL PAIN: 0
BACK PAIN: 0
COLOR CHANGE: 0
APNEA: 0
EYE REDNESS: 0
EYE ITCHING: 0

## 2021-03-04 NOTE — PROGRESS NOTES
Chief Complaint   Patient presents with    Other     F/U Constipation perforated diverticulitis 12/20       SUBJECTIVE:  HPI: Patient complains ofconstipation. The pain is described as colicky, cramping and pressure-like. Onset was several months ago. Symptoms have been gradually improving since. Aggravating factors: activity. Associatedsymptoms: Left lower quadrant abdominal pain. The patient denies fever or chills. Patient was recently admitted for acute perforated diverticulitis which resolved with IV and oral antibiotics. I have reviewed thepatient's(pertinent information to this visit) medical history, family history(scanned in  the Media tab under \"patient questioner\"), social history and review of systems with the patient today in the office. No past surgical history on file. Past Medical History:   Diagnosis Date    Arthritis     CAD (coronary artery disease) 04/22/2018    carotid 01/18/2021    mild 0-49% disease of the bilateral proximal internal carotid artery    Chronic kidney disease, stage III (moderate) 08/29/2017    H/O Doppler lower venous ultrasound 03/27/2019    No DVT or SVT, Significant reflux in RGSV and LGSV    H/O echocardiogram 06/11/2018    Limited study to evaluate LV function. Left ventricular size is normal Lv function is normal EF 50-55% EF has improved since last echo.      Hypertension      Family History   Problem Relation Age of Onset   [employer] Cancer Mother     Diabetes Mother     Coronary Art Dis Father     Heart Attack Father     Heart Disease Father     Coronary Art Dis Sister     Heart Attack Sister     Heart Disease Sister     Stroke Sister     High Blood Pressure Brother     High Cholesterol Brother     Kidney Disease Brother      Social History     Socioeconomic History    Marital status: Single     Spouse name: Not on file    Number of children: Not on file    Years of education: Not on file    Highest education level: Not on file Occupational History    Not on file   Social Needs    Financial resource strain: Not on file    Food insecurity     Worry: Not on file     Inability: Not on file    Transportation needs     Medical: Not on file     Non-medical: Not on file   Tobacco Use    Smoking status: Never Smoker    Smokeless tobacco: Never Used   Substance and Sexual Activity    Alcohol use: No     Comment: 1 cup of coffee daily    Drug use: No    Sexual activity: Not on file   Lifestyle    Physical activity     Days per week: Not on file     Minutes per session: Not on file    Stress: Not on file   Relationships    Social connections     Talks on phone: Not on file     Gets together: Not on file     Attends Denominational service: Not on file     Active member of club or organization: Not on file     Attends meetings of clubs or organizations: Not on file     Relationship status: Not on file    Intimate partner violence     Fear of current or ex partner: Not on file     Emotionally abused: Not on file     Physically abused: Not on file     Forced sexual activity: Not on file   Other Topics Concern    Not on file   Social History Narrative    Not on file       Current Outpatient Medications   Medication Sig Dispense Refill    carvedilol (COREG) 25 MG tablet Take 1 tablet by mouth daily 90 tablet 3    ferrous sulfate (IRON 325) 325 (65 Fe) MG tablet Take 325 mg by mouth daily      triamterene-hydroCHLOROthiazide (DYAZIDE) 37.5-25 MG per capsule Take 1 capsule by mouth daily      pantoprazole (PROTONIX) 40 MG tablet Take 1 tablet by mouth every morning (before breakfast) 90 tablet 1    NIFEdipine (ADALAT CC) 60 MG extended release tablet Take 1 tablet by mouth daily 30 tablet 3    losartan (COZAAR) 50 MG tablet Take 1 tablet by mouth daily 90 tablet 1    chlorthalidone (HYGROTON) 25 MG tablet Take 1 tablet by mouth daily 30 tablet 3    Compression Stockings MISC by Does not apply route 15 to 20 mmHG 1 each 0    nitroGLYCERIN Palpations: Abdomen is soft. There is no mass. Tenderness: There is no abdominal tenderness. There is no guarding or rebound. Musculoskeletal: Normal range of motion. Skin:     General: Skin is warm. Neurological:      Mental Status: She is alert and oriented to person, place, and time. ASSESSMENT:  1. Diverticulitis          PLAN:  Treatment:  Pt will try miralax and f/u in two weeks if this not improved will need sigmoid resection. Patient counseled on risks, benefits, and alternatives of treatment plan atlength. Patient states an understanding and willingness to proceed with plan. No orders of the defined types were placed in this encounter. No orders of the defined types were placed in this encounter. Follow Up:  No follow-ups on file.         Daniel Dumont MD

## 2021-03-16 RX ORDER — CLOPIDOGREL BISULFATE 75 MG/1
75 TABLET ORAL DAILY
Qty: 90 TABLET | Refills: 3 | Status: SHIPPED | OUTPATIENT
Start: 2021-03-16 | End: 2022-03-09

## 2021-06-29 ENCOUNTER — OFFICE VISIT (OUTPATIENT)
Dept: CARDIOLOGY CLINIC | Age: 76
End: 2021-06-29
Payer: MEDICARE

## 2021-06-29 VITALS
SYSTOLIC BLOOD PRESSURE: 128 MMHG | BODY MASS INDEX: 32.57 KG/M2 | HEIGHT: 62 IN | WEIGHT: 177 LBS | HEART RATE: 67 BPM | DIASTOLIC BLOOD PRESSURE: 84 MMHG

## 2021-06-29 DIAGNOSIS — E78.5 DYSLIPIDEMIA: ICD-10-CM

## 2021-06-29 DIAGNOSIS — R10.10 PAIN OF UPPER ABDOMEN: ICD-10-CM

## 2021-06-29 DIAGNOSIS — I25.10 ASCVD (ARTERIOSCLEROTIC CARDIOVASCULAR DISEASE): Primary | ICD-10-CM

## 2021-06-29 DIAGNOSIS — N18.32 STAGE 3B CHRONIC KIDNEY DISEASE (HCC): ICD-10-CM

## 2021-06-29 DIAGNOSIS — I50.32 CHRONIC DIASTOLIC CONGESTIVE HEART FAILURE (HCC): ICD-10-CM

## 2021-06-29 DIAGNOSIS — I10 ESSENTIAL HYPERTENSION: ICD-10-CM

## 2021-06-29 PROCEDURE — 1123F ACP DISCUSS/DSCN MKR DOCD: CPT | Performed by: INTERNAL MEDICINE

## 2021-06-29 PROCEDURE — 4040F PNEUMOC VAC/ADMIN/RCVD: CPT | Performed by: INTERNAL MEDICINE

## 2021-06-29 PROCEDURE — 1036F TOBACCO NON-USER: CPT | Performed by: INTERNAL MEDICINE

## 2021-06-29 PROCEDURE — G8417 CALC BMI ABV UP PARAM F/U: HCPCS | Performed by: INTERNAL MEDICINE

## 2021-06-29 PROCEDURE — G8399 PT W/DXA RESULTS DOCUMENT: HCPCS | Performed by: INTERNAL MEDICINE

## 2021-06-29 PROCEDURE — 1090F PRES/ABSN URINE INCON ASSESS: CPT | Performed by: INTERNAL MEDICINE

## 2021-06-29 PROCEDURE — G8427 DOCREV CUR MEDS BY ELIG CLIN: HCPCS | Performed by: INTERNAL MEDICINE

## 2021-06-29 PROCEDURE — 99214 OFFICE O/P EST MOD 30 MIN: CPT | Performed by: INTERNAL MEDICINE

## 2021-06-29 NOTE — LETTER
Dr. Tere Lezama  1945  [id number]    Have you had any Chest Pain that is not new? - No    Have you had any Shortness of Breath - No  Have you had any dizziness - No  Have you had any palpitations that are not new?  - No    Do you have any edema - swelling in No      Do you have a surgery or procedure scheduled in the near future - No

## 2021-06-29 NOTE — PROGRESS NOTES
CARDIOLOGY  NOTE        Chief Complaint: follow-up for cardiomyopathy at this for cardiovascular disease    HPI:   Jodie is a 66y.o. year old who has history as noted below. Jodie is overall doing very well denies any new complaints no shortness of breath no chest pain she is compliant with her compression stockings leg swelling is better but occasionally notices ankle swelling. She is taking chlorthalidone 50 mg Bp at home is much improved . running In 130's range   She went to ED in Jan from podiatry office due to BP in 220's She says BP at home is in 150's She also Sees Dr [place] for nephrology   No surgeries planned    She underwent PCI of the LAD in April 2018 when she presented to [facility] and reduced EF . She was switched to plavix due to cost issues . Currently only on Plavix no aspirin she has no chest pain or shortness of breath now. Ankle swellings better after compression stockings  . She did not want venous ablation she is feeling good , no chest pain , breathing is good .   UnfortunatelyShbrennan does not walk or do any exercise      Current Outpatient Medications   Medication Sig Dispense Refill    atorvastatin (LIPITOR) 80 MG tablet Take 80 mg by mouth daily      linaCLOtide (LINZESS) 72 MCG CAPS capsule Take 72 mcg by mouth every morning (before breakfast)      clopidogrel (PLAVIX) 75 MG tablet Take 1 tablet by mouth daily 90 tablet 3    carvedilol (COREG) 25 MG tablet Take 1 tablet by mouth daily 90 tablet 3    ferrous sulfate (IRON 325) 325 (65 Fe) MG tablet Take 325 mg by mouth daily      pantoprazole (PROTONIX) 40 MG tablet Take 1 tablet by mouth every morning (before breakfast) 90 tablet 1    NIFEdipine (ADALAT CC) 60 MG extended release tablet Take 1 tablet by mouth daily 30 tablet 3    losartan (COZAAR) 50 MG tablet Take 1 tablet by mouth daily 90 tablet 1    chlorthalidone (HYGROTON) 25 MG tablet Take 1 tablet by mouth daily 30 tablet 3    Compression Stockings MISC by Does not apply route 15 to 20 mmHG 1 each 0    nitroGLYCERIN (NITROSTAT) 0.4 MG SL tablet up to max of 3 total doses. If no relief after 1 dose, call 911. 25 tablet 2    hydroxychloroquine (PLAQUENIL) 200 MG tablet Take 200 mg by mouth daily       No current facility-administered medications for this visit. Allergies:   Pcn [penicillins]    Patient History:  Past Medical History:   Diagnosis Date    Arthritis     CAD (coronary artery disease) 04/22/2018    carotid 01/18/2021    mild 0-49% disease of the bilateral proximal internal carotid artery    Chronic kidney disease, stage III (moderate) ([facility] [address] 08/29/2017    H/O Doppler lower venous ultrasound 03/27/2019    No DVT or SVT, Significant reflux in RGSV and LGSV    H/O echocardiogram 06/11/2018    Limited study to evaluate LV function. Left ventricular size is normal Lv function is normal EF 50-55% EF has improved since last echo.  Hypertension      History reviewed. No pertinent surgical history. Family History   Problem Relation Age of Onset   Clarissa Jensen Cancer Mother     Diabetes Mother     Coronary Art Dis Father     Heart Attack Father     Heart Disease Father     Coronary Art Dis Sister     Heart Attack Sister     Heart Disease Sister     Stroke Sister     High Blood Pressure Brother     High Cholesterol Brother     Kidney Disease Brother      Social History     Tobacco Use    Smoking status: Never Smoker    Smokeless tobacco: Never Used   Substance Use Topics    Alcohol use: No     Comment: 1 cup of coffee daily        Review of Systems:   · Constitutional: No Fever or Weight Loss   · Eyes: No Decreased Vision  · ENT: No Headaches, Hearing Loss or Vertigo  · Cardiovascular: as per note above   · Respiratory: No cough or wheezing and as per note above.    · Gastrointestinal: No abdominal pain, appetite loss, blood in stools, constipation, diarrhea or heartburn  · Genitourinary: reports urgency and incontinence, trouble voiding, or hematuria  · Musculoskeletal:  None  · Integumentary: No rash or pruritis  · Neurological: No TIA or stroke symptoms  · Psychiatric: No anxiety or depression  · Endocrine: No malaise, fatigue or temperature intolerance  · Hematologic/Lymphatic: No bleeding problems, blood clots or swollen lymph nodes  · Allergic/Immunologic: No nasal congestion or hives    Objective:      Physical Exam:  /84   Pulse 67   Ht 5' 2\" (1.575 m)   Wt 177 lb (80.3 kg)   BMI 32.37 kg/m²   Wt Readings from Last 3 Encounters:   06/29/21 177 lb (80.3 kg)   05/04/21 178 lb 3.2 oz (80.8 kg)   03/04/21 170 lb 3.2 oz (77.2 kg)     Body mass index is 32.37 kg/m². Vitals:    06/29/21 0955   BP: 128/84   Pulse: 67    General Appearance:  No distress, conversant  Constitutional:  Well developed, Well nourished, No acute distress, Non-toxic appearance. HENT:  Normocephalic, Atraumatic, Bilateral external ears normal, Oropharynx moist, No oral exudates,right carotid bruit Nose normal. Neck- Normal range of motion, No tenderness, Supple, No stridor,no apical-carotid delay  Eyes:  PERRL, EOMI, Conjunctiva normal, No discharge. Respiratory:  Normal breath sounds, No respiratory distress, No wheezing, No chest tenderness. ,no use of accessory muscles, NO crackles  Cardiovascular: (PMI) apex non displaced,no lifts no thrills,S1 and S2 audible, systolic 2/6 murmur, No signs of ankle edema, or volume overload, No evidence of JVD, No crackles  GI:  Bowel sounds normal, Soft, No tenderness, No masses, No gross visceromegaly   :  No costovertebral angle tenderness   Musculoskeletal:  No edema, no tenderness, no deformities.  Back- no tenderness  Integument:  Well hydrated, no rash   Lymphatic:  No lymphadenopathy noted   Neurologic:  Alert & oriented x 3, CN 2-12 normal, normal motor function, normal sensory function, no focal deficits noted   Psychiatric:  Speech and behavior appropriate           Medical decision making and Data review:  DATA:  Lab Results   Component Value Date    TROPONINT 1.340 ([facility]) 04/21/2018     BNP:    Lab Results   Component Value Date    PROBNP 10,146 (H) 04/21/2018     PT/INR:  No results found for: PTINR  No results found for: LABA1C  Lab Results   Component Value Date    CHOL 135 06/08/2020    TRIG 63 06/08/2020    HDL 74 (A) 06/08/2020    LDLCALC 48 06/08/2020     Lab Results   Component Value Date    ALT 13 12/10/2020    AST 22 12/10/2020     TSH:   Lab Results   Component Value Date    TSH 0.804 08/30/2017     Cath 4/22/18   Procedure Summary   Radial Access   S/p PCI for 99 % mid and 80 % proximal LAD lesion using   overlapping BART 3.25 X38 inflated to 14 atms and 3.5 X 12   inflated 12 bam   Circ is dominant   RCA is non dominant   EF is > 55%   LMCA: short almost immediately bifurcates into Circ and LAD    LAD: 99 % mid lesion , there is diffuse 70 % to 80 % proximal hazy lesion just  before diagonal take off     LCx: dominant large vessel , gives large OM widely patent  RCA: non dominant small vessel   echo 4/23/18   Summary   Left ventricular function is abnormal,septal and apical wall akinesis   estimated EF is 35 to 40 %   Mild concentric left ventricular hypertrophy.   Grade III diastolic dysfunction.   Mildly dilated left atrium.   Right ventricular systolic pressure of 31 mm Hg.   Mild mitral regurgitation is present.   Mild tricuspid regurgitation.   No evidence of any pericardial effusion.    Echo 6/11/18   Summary   Limited Study to evaluate LV function   Left Ventricular size is Normal .   LV function is normal , EF 50-55%.   EF has improved since last echo in April 2018   No regional wall motion abnormality.  Roxianne Tanesha dilated left atrium.   No evidence of pericardial effusion.     Vein doppler 3/27/19  Summary        No evidence of DVT or SVT in the bilateral common femoral vein, femoral    vein, popliteal vein, greater saphenous vein or small saphenous vein.    Significant reflux noted of the Right GSV at the level of SFJ (2.4s).  Significant reflux noted in the Left CFV and the GSV at the level of the SFJ    (9.0s).      Recommendations        Advice thigh high pressure stockings, 20 to 30 mm of Hg.    Keep feet elevated.    Increase walking.        OV in 6 weeks             All labs, medications and tests reviewed by myself including data and history from outside source , patient and available family . Assessment & Plan:      1. ASCVD (arteriosclerotic cardiovascular disease)    2. Essential hypertension    3. Stage 3b chronic kidney disease ([address]    4. Chronic diastolic congestive heart failure ([address]    5. Dyslipidemia    6. Pain of upper abdomen         ASCVD (arteriosclerotic cardiovascular disease)  Status post overlapping stent to proximal LAD for 90% lesion/thrombus On 4/22/18. continue only plavix , no aspirin no angina doing well, currently angina class II     Congestive heart failure ([address]  She appears euvolemic. She says her ankles swell  Repeat echo shows improved EF  50-55% After lad intervention . Continue  Toprol-XL. Continue losartan. She does not appear to retain water. Ankles swelling multifactorial     Ankle swelling determined by examination  Doppler shows significant  Reflux, she feels better after socks, continue to  wear compression stockings she refused venous ablation    Carotid Bruit  Carotid doppler were normal in Jan 2021    CKD  Sees Dr Susana Webster hypertension  Blood pressures well controlled at home according to her blood pressure log,continue nifedipine to 90 mg daily . continue coreg 25 mg bid, continue chlorthalidone 50 mg ,  On losartan 50 mg check lytes  Creatinine is 1.4 range       Dyslipidemia :  Anand Zaidi had lab work recently,  Lipid profile was reviewed with patient. continue high-dose statins , continue lipitor will check lipids , repeat labs with Dr Blas Ramírez extensively and medication compliance urged.   We discussed that for the  prevention of ASCVD our  goal is aggressive risk modification. Patient is encouraged to exercise even a brisk walk for 30 minutes  at least 3 to 4 times a week   Various goals were discussed and questions answered. Continue current medications. Appropriate prescriptions are addressed and refills ordered. Questions answered and patient verbalizes understanding. Call for any problems, questions, or concerns. Continue all other medications of all above medical condition listed as is. Return in about 6 months (around 12/29/2021). Please note this report has been partially produced using speech recognition software and may contain errors related to that system including errors in grammar, punctuation, and spelling, as well as words and phrases that may be inappropriate.  If there are any questions or concerns please feel free to contact the dictating provider for clarification.

## 2021-08-31 PROBLEM — E87.6 HYPOKALEMIA: Status: ACTIVE | Noted: 2021-08-31

## 2022-01-11 ENCOUNTER — OFFICE VISIT (OUTPATIENT)
Dept: CARDIOLOGY CLINIC | Age: 77
End: 2022-01-11
Payer: MEDICARE

## 2022-01-11 VITALS
HEIGHT: 61 IN | SYSTOLIC BLOOD PRESSURE: 138 MMHG | BODY MASS INDEX: 34.81 KG/M2 | HEART RATE: 68 BPM | DIASTOLIC BLOOD PRESSURE: 80 MMHG | WEIGHT: 184.4 LBS

## 2022-01-11 DIAGNOSIS — I25.10 ASCVD (ARTERIOSCLEROTIC CARDIOVASCULAR DISEASE): Primary | ICD-10-CM

## 2022-01-11 DIAGNOSIS — I10 ESSENTIAL HYPERTENSION: ICD-10-CM

## 2022-01-11 DIAGNOSIS — N18.32 STAGE 3B CHRONIC KIDNEY DISEASE (HCC): ICD-10-CM

## 2022-01-11 DIAGNOSIS — D64.9 ANEMIA, UNSPECIFIED TYPE: ICD-10-CM

## 2022-01-11 DIAGNOSIS — E78.5 DYSLIPIDEMIA: ICD-10-CM

## 2022-01-11 DIAGNOSIS — R09.89 BRUIT OF RIGHT CAROTID ARTERY: ICD-10-CM

## 2022-01-11 PROCEDURE — G8417 CALC BMI ABV UP PARAM F/U: HCPCS | Performed by: INTERNAL MEDICINE

## 2022-01-11 PROCEDURE — G8399 PT W/DXA RESULTS DOCUMENT: HCPCS | Performed by: INTERNAL MEDICINE

## 2022-01-11 PROCEDURE — G8427 DOCREV CUR MEDS BY ELIG CLIN: HCPCS | Performed by: INTERNAL MEDICINE

## 2022-01-11 PROCEDURE — G8484 FLU IMMUNIZE NO ADMIN: HCPCS | Performed by: INTERNAL MEDICINE

## 2022-01-11 PROCEDURE — 99214 OFFICE O/P EST MOD 30 MIN: CPT | Performed by: INTERNAL MEDICINE

## 2022-01-11 PROCEDURE — 1123F ACP DISCUSS/DSCN MKR DOCD: CPT | Performed by: INTERNAL MEDICINE

## 2022-01-11 PROCEDURE — 1036F TOBACCO NON-USER: CPT | Performed by: INTERNAL MEDICINE

## 2022-01-11 PROCEDURE — 1090F PRES/ABSN URINE INCON ASSESS: CPT | Performed by: INTERNAL MEDICINE

## 2022-01-11 PROCEDURE — 4040F PNEUMOC VAC/ADMIN/RCVD: CPT | Performed by: INTERNAL MEDICINE

## 2022-01-11 RX ORDER — LOSARTAN POTASSIUM 100 MG/1
100 TABLET ORAL DAILY
Qty: 90 TABLET | Refills: 3 | Status: SHIPPED | OUTPATIENT
Start: 2022-01-11

## 2022-01-11 NOTE — PROGRESS NOTES
CARDIOLOGY  NOTE        Chief Complaint: follow-up for cardiomyopathy at this for cardiovascular disease    HPI:   Shalonda Ervin is a 64y.o. year old who has history as noted below. Shalonda Ervin is overall doing very well denies any new complaints no shortness of breath no chest pain she is compliant with her compression stockings leg swelling is better but occasionally notices ankle swelling. She is taking chlorthalidone 50 mg Bp at home is much improved . BP .running In 130's range    She says BP at home is in 150's She also Sees Dr Oliver Bryan for nephrology for CKD     She underwent PCI of the LAD in April 2018 when she presented to [facility] and reduced EF . She was switched to plavix due to cost issues . Currently only on Plavix no aspirin she has no chest pain or shortness of breath now. Ankle swellings better after compression stockings  . She did not want venous ablation she is feeling good , no chest pain , breathing is good .   UnfortunatelyShe does not walk or do any exercise      Current Outpatient Medications   Medication Sig Dispense Refill    potassium chloride (KLOR-CON M) 20 MEQ extended release tablet Take 1 tablet by mouth daily 90 tablet 3    atorvastatin (LIPITOR) 80 MG tablet Take 80 mg by mouth daily      clopidogrel (PLAVIX) 75 MG tablet Take 1 tablet by mouth daily 90 tablet 3    carvedilol (COREG) 25 MG tablet Take 1 tablet by mouth daily 90 tablet 3    NIFEdipine (ADALAT CC) 60 MG extended release tablet Take 1 tablet by mouth daily 30 tablet 3    losartan (COZAAR) 50 MG tablet Take 1 tablet by mouth daily 90 tablet 1    chlorthalidone (HYGROTON) 25 MG tablet Take 1 tablet by mouth daily 30 tablet 3    Compression Stockings MISC by Does not apply route 15 to 20 mmHG 1 each 0    hydroxychloroquine (PLAQUENIL) 200 MG tablet Take 200 mg by mouth daily      ferrous sulfate (IRON 325) 325 (65 Fe) MG tablet Take 325 mg by mouth daily (Patient not taking: Reported on 1/11/2022)      nitroGLYCERIN (NITROSTAT) 0.4 MG SL tablet up to max of 3 total doses. If no relief after 1 dose, call 911. (Patient not taking: Reported on 1/11/2022) 25 tablet 2     No current facility-administered medications for this visit. Allergies:   Pcn [penicillins]    Patient History:  Past Medical History:   Diagnosis Date    Arthritis     CAD (coronary artery disease) 04/22/2018    carotid 01/18/2021    mild 0-49% disease of the bilateral proximal internal carotid artery    Chronic kidney disease, stage III (moderate) ([facility] [address] 08/29/2017    H/O Doppler lower venous ultrasound 03/27/2019    No DVT or SVT, Significant reflux in RGSV and LGSV    H/O echocardiogram 06/11/2018    Limited study to evaluate LV function. Left ventricular size is normal Lv function is normal EF 50-55% EF has improved since last echo.  Hypertension      History reviewed. No pertinent surgical history. Family History   Problem Relation Age of Onset   [place] Cancer Mother     Diabetes Mother     Coronary Art Dis Father     Heart Attack Father     Heart Disease Father     Coronary Art Dis Sister     Heart Attack Sister     Heart Disease Sister     Stroke Sister     High Blood Pressure Brother     High Cholesterol Brother     Kidney Disease Brother      Social History     Tobacco Use    Smoking status: Never Smoker    Smokeless tobacco: Never Used   Substance Use Topics    Alcohol use: No     Comment: 1 cup of coffee daily        Review of Systems:   · Constitutional: No Fever or Weight Loss   · Eyes: No Decreased Vision  · ENT: No Headaches, Hearing Loss or Vertigo  · Cardiovascular: as per note above   · Respiratory: No cough or wheezing and as per note above.    · Gastrointestinal: No abdominal pain, appetite loss, blood in stools, constipation, diarrhea or heartburn  · Genitourinary: reports urgency and incontinence, trouble voiding, or hematuria  · Musculoskeletal:  None  · Integumentary: No rash or pruritis  · Neurological: No TIA or stroke symptoms  · Psychiatric: No anxiety or depression  · Endocrine: No malaise, fatigue or temperature intolerance  · Hematologic/Lymphatic: No bleeding problems, blood clots or swollen lymph nodes  · Allergic/Immunologic: No nasal congestion or hives    Objective:      Physical Exam:  /80   Pulse 68   Ht 5' 1\" (1.549 m)   Wt 184 lb 6.4 oz (83.6 kg)   BMI 34.84 kg/m²   Wt Readings from Last 3 Encounters:   01/11/22 184 lb 6.4 oz (83.6 kg)   08/31/21 179 lb 3.2 oz (81.3 kg)   06/29/21 177 lb (80.3 kg)     Body mass index is 34.84 kg/m². Vitals:    01/11/22 0854   BP: 138/80   Pulse: 68    General Appearance:  No distress, conversant  Constitutional:  Well developed, Well nourished, No acute distress, Non-toxic appearance. HENT:  Normocephalic, Atraumatic, Bilateral external ears normal, Oropharynx moist, No oral exudates,right carotid bruit Nose normal. Neck- Normal range of motion, No tenderness, Supple, No stridor,no apical-carotid delay  Eyes:  PERRL, EOMI, Conjunctiva normal, No discharge. Respiratory:  Normal breath sounds, No respiratory distress, No wheezing, No chest tenderness. ,no use of accessory muscles, NO crackles  Cardiovascular: (PMI) apex non displaced,no lifts no thrills,S1 and S2 audible, systolic 2/6 murmur, No signs of ankle edema, or volume overload, No evidence of JVD, No crackles  GI:  Bowel sounds normal, Soft, No tenderness, No masses, No gross visceromegaly   :  No costovertebral angle tenderness   Musculoskeletal:  No edema, no tenderness, no deformities.  Back- no tenderness  Integument:  Well hydrated, no rash   Lymphatic:  No lymphadenopathy noted   Neurologic:  Alert & oriented x 3, CN 2-12 normal, normal motor function, normal sensory function, no focal deficits noted   Psychiatric:  Speech and behavior appropriate           Medical decision making and Data review:  DATA:  Lab Results   Component Value Date    TROPONINT 1.340 (HH) 04/21/2018     BNP:    Lab Results   Component Value Date    PROBNP 10,146 (H) 04/21/2018     PT/INR:  No results found for: PTINR  No results found for: LABA1C  Lab Results   Component Value Date    CHOL 135 06/08/2020    TRIG 63 06/08/2020    HDL 74 (A) 06/08/2020    LDLCALC 48 06/08/2020     Lab Results   Component Value Date    ALT 13 12/10/2020    AST 22 12/10/2020     TSH:   Lab Results   Component Value Date    TSH 0.804 08/30/2017     Cath 4/22/18   Procedure Summary   Radial Access   S/p PCI for 99 % mid and 80 % proximal LAD lesion using   overlapping BART 3.25 X38 inflated to 14 atms and 3.5 X 12   inflated 12 bam   Circ is dominant   RCA is non dominant   EF is > 55%   LMCA: short almost immediately bifurcates into Circ and LAD    LAD: 99 % mid lesion , there is diffuse 70 % to 80 % proximal hazy lesion just  before diagonal take off     LCx: dominant large vessel , gives large OM widely patent  RCA: non dominant small vessel   echo 4/23/18   Summary   Left ventricular function is abnormal,septal and apical wall akinesis   estimated EF is 35 to 40 %   Mild concentric left ventricular hypertrophy.   Grade III diastolic dysfunction.   Mildly dilated left atrium.   Right ventricular systolic pressure of 31 mm Hg.   Mild mitral regurgitation is present.   Mild tricuspid regurgitation.   No evidence of any pericardial effusion.    Echo 6/11/18   Summary   Limited Study to evaluate LV function   Left Ventricular size is Normal .   LV function is normal , EF 50-55%.   EF has improved since last echo in April 2018   No regional wall motion abnormality.  [place] dilated left atrium.   No evidence of pericardial effusion.     Vein doppler 3/27/19  Summary        No evidence of DVT or SVT in the bilateral common femoral vein, femoral    vein, popliteal vein, greater saphenous vein or small saphenous vein.    Significant reflux noted of the Right GSV at the level of SFJ (2.4s).     Significant reflux noted in the Left CFV and the GSV at the level of the SFJ    (9.0s).      Recommendations        Advice thigh high pressure stockings, 20 to 30 mm of Hg.    Keep feet elevated.    Increase walking.        OV in 6 weeks             All labs, medications and tests reviewed by myself including data and history from outside source , patient and available family . Assessment & Plan:      1. ASCVD (arteriosclerotic cardiovascular disease)    2. Anemia, unspecified type    3. Essential hypertension    4. Stage 3b chronic kidney disease ([address]    5. Dyslipidemia    6. Bruit of right carotid artery         ASCVD (arteriosclerotic cardiovascular disease)  Status post overlapping stent to proximal LAD for 90% lesion/thrombus On 4/22/18. continue only plavix , no aspirin no angina doing well, currently angina class II     Congestive heart failure ([facility] [address]  She appears euvolemic. She says her ankles swell  Repeat echo shows improved EF  50-55% After lad intervention . Continue  Toprol-XL. Continue losartan. She does not appear to retain water. Ankles swelling multifactorial     Ankle swelling determined by examination  Doppler shows significant  Reflux, she feels better after socks, continue to  wear compression stockings she refused venous ablation    Carotid Bruit  Carotid doppler were normal in Jan 2021    CKD  Sees Dr Belén Butterfield hypertension  Blood pressures higher at  home according to her blood pressure log,continue nifedipine to 60 mg daily . continue coreg 25 mg bid, continue chlorthalidone 50 mg ,  Increase  losartan 100 mg check lytes  Creatinine is 1.4 range       Dyslipidemia :  Diego Moreno had lab work recently,  Lipid profile was reviewed with patient. continue high-dose statins , continue lipitor will check lipids , repeat labs with Dr Marilyn West extensively and medication compliance urged. We discussed that for the  prevention of ASCVD our  goal is aggressive risk modification. Patient is encouraged to exercise even a brisk walk for 30 minutes  at least 3 to 4 times a week   Various goals were discussed and questions answered. Continue current medications. Appropriate prescriptions are addressed and refills ordered. Questions answered and patient verbalizes understanding. Call for any problems, questions, or concerns. Continue all other medications of all above medical condition listed as is. No follow-ups on file. Please note this report has been partially produced using speech recognition software and may contain errors related to that system including errors in grammar, punctuation, and spelling, as well as words and phrases that may be inappropriate.  If there are any questions or concerns please feel free to contact the dictating provider for clarification.

## 2022-01-31 ENCOUNTER — HOSPITAL ENCOUNTER (OUTPATIENT)
Dept: WOMENS IMAGING | Age: 77
Discharge: HOME | End: 2022-01-31
Payer: MEDICARE

## 2022-01-31 DIAGNOSIS — Z12.39 SCREENING BREAST EXAMINATION: ICD-10-CM

## 2022-01-31 PROCEDURE — 77067 SCR MAMMO BI INCL CAD: CPT

## 2022-03-09 RX ORDER — CLOPIDOGREL BISULFATE 75 MG/1
75 TABLET ORAL DAILY
Qty: 90 TABLET | Refills: 3 | Status: SHIPPED | OUTPATIENT
Start: 2022-03-09

## 2022-07-20 LAB
AMBIGUOUS ABBREVIATION: NORMAL
CHOLESTEROL, TOTAL: 136 MG/DL (ref 100–199)
COMMENT: NORMAL
HDLC SERPL-MCNC: 69 MG/DL
LDL CHOLESTEROL CALCULATED: 55 MG/DL (ref 0–99)
TRIGL SERPL-MCNC: 55 MG/DL (ref 0–149)
VLDLC SERPL CALC-MCNC: 12 MG/DL (ref 5–40)

## 2022-07-25 ENCOUNTER — OFFICE VISIT (OUTPATIENT)
Dept: CARDIOLOGY CLINIC | Age: 77
End: 2022-07-25
Payer: MEDICARE

## 2022-07-25 VITALS
SYSTOLIC BLOOD PRESSURE: 110 MMHG | BODY MASS INDEX: 34.63 KG/M2 | DIASTOLIC BLOOD PRESSURE: 54 MMHG | HEART RATE: 71 BPM | HEIGHT: 61 IN | WEIGHT: 183.4 LBS

## 2022-07-25 DIAGNOSIS — N18.31 STAGE 3A CHRONIC KIDNEY DISEASE (HCC): ICD-10-CM

## 2022-07-25 DIAGNOSIS — I10 ESSENTIAL HYPERTENSION: Primary | ICD-10-CM

## 2022-07-25 DIAGNOSIS — I50.32 CHRONIC DIASTOLIC CONGESTIVE HEART FAILURE (HCC): ICD-10-CM

## 2022-07-25 DIAGNOSIS — E78.5 DYSLIPIDEMIA: ICD-10-CM

## 2022-07-25 DIAGNOSIS — R09.89 BRUIT OF RIGHT CAROTID ARTERY: ICD-10-CM

## 2022-07-25 DIAGNOSIS — I25.10 ASCVD (ARTERIOSCLEROTIC CARDIOVASCULAR DISEASE): ICD-10-CM

## 2022-07-25 PROCEDURE — 93000 ELECTROCARDIOGRAM COMPLETE: CPT | Performed by: INTERNAL MEDICINE

## 2022-07-25 PROCEDURE — G8399 PT W/DXA RESULTS DOCUMENT: HCPCS | Performed by: INTERNAL MEDICINE

## 2022-07-25 PROCEDURE — G8427 DOCREV CUR MEDS BY ELIG CLIN: HCPCS | Performed by: INTERNAL MEDICINE

## 2022-07-25 PROCEDURE — 1090F PRES/ABSN URINE INCON ASSESS: CPT | Performed by: INTERNAL MEDICINE

## 2022-07-25 PROCEDURE — 99214 OFFICE O/P EST MOD 30 MIN: CPT | Performed by: INTERNAL MEDICINE

## 2022-07-25 PROCEDURE — 1123F ACP DISCUSS/DSCN MKR DOCD: CPT | Performed by: INTERNAL MEDICINE

## 2022-07-25 PROCEDURE — 1036F TOBACCO NON-USER: CPT | Performed by: INTERNAL MEDICINE

## 2022-07-25 PROCEDURE — G8417 CALC BMI ABV UP PARAM F/U: HCPCS | Performed by: INTERNAL MEDICINE

## 2022-07-25 RX ORDER — CARVEDILOL 12.5 MG/1
12.5 TABLET ORAL 2 TIMES DAILY
Qty: 90 TABLET | Refills: 3 | Status: SHIPPED | OUTPATIENT
Start: 2022-07-25 | End: 2022-09-09 | Stop reason: SDUPTHER

## 2022-07-25 RX ORDER — CARVEDILOL 25 MG/1
25 TABLET ORAL 2 TIMES DAILY
Qty: 90 TABLET | Refills: 3 | Status: SHIPPED | OUTPATIENT
Start: 2022-07-25 | End: 2022-07-25

## 2022-07-25 NOTE — PROGRESS NOTES
CARDIOLOGY  NOTE        Chief Complaint: follow-up for cardiomyopathy at this for cardiovascular disease    HPI:   Carly Franco is a 64y.o. year old who has history as noted below. Carly Franco is overall doing very well denies any new complaints no shortness of breath no chest pain she is compliant with her compression stockings leg swelling is better but occasionally notices ankle swelling. She is taking chlorthalidone 50 mg Bp at home is much improved . BP .running In 130's range     She also Sees Dr Bryanna Wallace for nephrology for CKD    Due to confusion she is taking coreg only once a day    She underwent PCI of the LAD in April 2018 when she presented to [facility] and reduced EF . She was switched to plavix due to cost issues . Currently only on Plavix no aspirin she has no chest pain or shortness of breath now. Ankle swellings better after compression stockings  . She did not want venous ablation she is feeling good , no chest pain , breathing is good . UnfortunatelyShe does not walk or do any exercise      Current Outpatient Medications   Medication Sig Dispense Refill    carvedilol (COREG) 12.5 MG tablet Take 1 tablet by mouth in the morning and 1 tablet before bedtime.  90 tablet 3    clopidogrel (PLAVIX) 75 MG tablet Take 1 tablet by mouth daily 90 tablet 3    losartan (COZAAR) 100 MG tablet Take 1 tablet by mouth daily 90 tablet 3    potassium chloride (KLOR-CON M) 20 MEQ extended release tablet Take 1 tablet by mouth daily 90 tablet 3    atorvastatin (LIPITOR) 80 MG tablet Take 80 mg by mouth daily      ferrous sulfate (IRON 325) 325 (65 Fe) MG tablet Take 325 mg by mouth daily       NIFEdipine (ADALAT CC) 60 MG extended release tablet Take 1 tablet by mouth daily 30 tablet 3    chlorthalidone (HYGROTON) 25 MG tablet Take 1 tablet by mouth daily 30 tablet 3    Compression Stockings MISC by Does not apply route 15 to 20 mmHG 1 each 0    nitroGLYCERIN (NITROSTAT) 0.4 MG SL tablet up to max of 3 total doses. If no relief after 1 dose, call 911. 79 tablet 2    hydroxychloroquine (PLAQUENIL) 200 MG tablet Take 200 mg by mouth daily       No current facility-administered medications for this visit. Allergies:   Pcn [penicillins]    Patient History:  Past Medical History:   Diagnosis Date    Arthritis     CAD (coronary artery disease) 04/22/2018    carotid 01/18/2021    mild 0-49% disease of the bilateral proximal internal carotid artery    Chronic kidney disease, stage III (moderate) ([address] 08/29/2017    H/O Doppler lower venous ultrasound 03/27/2019    No DVT or SVT, Significant reflux in RGSV and LGSV    H/O echocardiogram 06/11/2018    Limited study to evaluate LV function. Left ventricular size is normal Lv function is normal EF 50-55% EF has improved since last echo. Hypertension      No past surgical history on file. Family History   Problem Relation Age of Onset    Cancer Mother     Diabetes Mother     Coronary Art Dis Father     Heart Attack Father     Heart Disease Father     Coronary Art Dis Sister     Heart Attack Sister     Heart Disease Sister     Stroke Sister     Breast Cancer Sister     High Blood Pressure Brother     High Cholesterol Brother     Kidney Disease Brother      Social History     Tobacco Use    Smoking status: Never    Smokeless tobacco: Never   Substance Use Topics    Alcohol use: No     Comment: 1 cup of coffee daily        Review of Systems:   Constitutional: No Fever or Weight Loss   Eyes: No Decreased Vision  ENT: No Headaches, Hearing Loss or Vertigo  Cardiovascular: as per note above   Respiratory: No cough or wheezing and as per note above.    Gastrointestinal: No abdominal pain, appetite loss, blood in stools, constipation, diarrhea or heartburn  Genitourinary: reports urgency and incontinence, trouble voiding, or hematuria  Musculoskeletal:  None  Integumentary: No rash or pruritis  Neurological: No TIA or stroke symptoms  Psychiatric: No anxiety or depression  Endocrine: No malaise, fatigue or temperature intolerance  Hematologic/Lymphatic: No bleeding problems, blood clots or swollen lymph nodes  Allergic/Immunologic: No nasal congestion or hives    Objective:      Physical Exam:  BP (!) 110/54   Pulse 71   Ht 5' 1\" (1.549 m)   Wt 183 lb 6.4 oz (83.2 kg)   BMI 34.65 kg/m²   Wt Readings from Last 3 Encounters:   07/25/22 183 lb 6.4 oz (83.2 kg)   03/01/22 182 lb 6.4 oz (82.7 kg)   01/11/22 184 lb 6.4 oz (83.6 kg)     Body mass index is 34.65 kg/m². Vitals:    07/25/22 1234   BP: (!) 110/54   Pulse:     General Appearance:  No distress, conversant  Constitutional:  Well developed, Well nourished, No acute distress, Non-toxic appearance. HENT:  Normocephalic, Atraumatic, Bilateral external ears normal, Oropharynx moist, No oral exudates,right carotid bruit Nose normal. Neck- Normal range of motion, No tenderness, Supple, No stridor,no apical-carotid delay  Eyes:  PERRL, EOMI, Conjunctiva normal, No discharge. Respiratory:  Normal breath sounds, No respiratory distress, No wheezing, No chest tenderness. ,no use of accessory muscles, NO crackles  Cardiovascular: (PMI) apex non displaced,no lifts no thrills,S1 and S2 audible, systolic 2/6 murmur, No signs of ankle edema, or volume overload, No evidence of JVD, No crackles  GI:  Bowel sounds normal, Soft, No tenderness, No masses, No gross visceromegaly   :  No costovertebral angle tenderness   Musculoskeletal:  No edema, no tenderness, no deformities.  Back- no tenderness  Integument:  Well hydrated, no rash   Lymphatic:  No lymphadenopathy noted   Neurologic:  Alert & oriented x 3, CN 2-12 normal, normal motor function, normal sensory function, no focal deficits noted   Psychiatric:  Speech and behavior appropriate           Medical decision making and Data review:  DATA:  Lab Results   Component Value Date    TROPONINT 1.340 ([facility]) 04/21/2018     BNP:    Lab Results   Component Value Date PROBNP 10,146 (H) 04/21/2018     PT/INR:  No results found for: PTINR  No results found for: LABA1C  Lab Results   Component Value Date    CHOL 136 07/19/2022    TRIG 55 07/19/2022    HDL 69 07/19/2022    LDLCALC 55 07/19/2022     Lab Results   Component Value Date    ALT 13 12/10/2020    AST 22 12/10/2020     TSH:   Lab Results   Component Value Date    TSH 0.804 08/30/2017     Cath 4/22/18   Procedure Summary   Radial Access   S/p PCI for 99 % mid and 80 % proximal LAD lesion using   overlapping BART 3.25 X38 inflated to 14 atms and 3.5 X 12   inflated 12 bam   Circ is dominant   RCA is non dominant   EF is > 55%   LMCA: short almost immediately bifurcates into Circ and LAD    LAD: 99 % mid lesion , there is diffuse 70 % to 80 % proximal hazy lesion just  before diagonal take off     LCx: dominant large vessel , gives large OM widely patent  RCA: non dominant small vessel   echo 4/23/18   Summary   Left ventricular function is abnormal,septal and apical wall akinesis   estimated EF is 35 to 40 %   Mild concentric left ventricular hypertrophy. Grade III diastolic dysfunction. Mildly dilated left atrium. Right ventricular systolic pressure of 31 mm Hg. Mild mitral regurgitation is present. Mild tricuspid regurgitation. No evidence of any pericardial effusion. Echo 6/11/18   Summary   Limited Study to evaluate LV function   Left Ventricular size is Normal .   LV function is normal , EF 50-55%. EF has improved since last echo in April 2018   No regional wall motion abnormality. Mildly dilated left atrium. No evidence of pericardial effusion. Vein doppler 3/27/19  Summary        No evidence of DVT or SVT in the bilateral common femoral vein, femoral    vein, popliteal vein, greater saphenous vein or small saphenous vein. Significant reflux noted of the Right GSV at the level of SFJ (2.4s). Significant reflux noted in the Left CFV and the GSV at the level of the SFJ    (9.0s). Recommendations        Advice thigh high pressure stockings, 20 to 30 mm of Hg. Keep feet elevated. Increase walking. OV in 6 weeks             All labs, medications and tests reviewed by myself including data and history from outside source , patient and available family . Assessment & Plan:      1. Essential hypertension    2. ASCVD (arteriosclerotic cardiovascular disease)    3. Bruit of right carotid artery    4. Chronic diastolic congestive heart failure ([address]    5. Dyslipidemia    6. Stage 3a chronic kidney disease (HCC)         ASCVD (arteriosclerotic cardiovascular disease)  Status post overlapping stent to proximal LAD for 90% lesion/thrombus On 4/22/18. continue only plavix , no aspirin no angina doing well, currently angina class II , LDL is in 50's on lipitor 80 mg daily     Congestive heart failure ([address]  She appears euvolemic. She says her ankles swell  Repeat echo shows improved EF  50-55% After lad intervention . She is taking coreg 25 mg once a day , asked her to take coreg 12.5 mg twice a day instead  Continue losartan 100 mg daily . She does not appear to retain water. Ankles swelling multifactorial     Ankle swelling determined by examination  Doppler shows significant  Reflux, she feels better after socks, continue to  wear compression stockings she refused venous ablation    Carotid Bruit  Carotid doppler were normal in Jan 2021    CKD  Sees Dr Gu Lines hypertension  Blood pressures higher at  home according to her blood pressure log,continue nifedipine to 60 mg daily . coreg 12.5 mg bid, continue chlorthalidone 50 mg ,   losartan 100 mg check lytes , nifedipine cd 60 mg daily   Creatinine is 1.4 range       Dyslipidemia :  Nimesh Matt had lab work recently,  Lipid profile was reviewed with patient. continue high-dose statins , continue lipitor will check lipids , repeat labs with Dr Lauren Simons extensively and medication compliance urged.   We discussed that for the prevention of ASCVD our  goal is aggressive risk modification. Patient is encouraged to exercise even a brisk walk for 30 minutes  at least 3 to 4 times a week   Various goals were discussed and questions answered. Continue current medications. Appropriate prescriptions are addressed and refills ordered. Questions answered and patient verbalizes understanding. Call for any problems, questions, or concerns. Continue all other medications of all above medical condition listed as is. Return in about 6 months (around 1/25/2023). Please note this report has been partially produced using speech recognition software and may contain errors related to that system including errors in grammar, punctuation, and spelling, as well as words and phrases that may be inappropriate. If there are any questions or concerns please feel free to contact the dictating provider for clarification.

## 2022-09-10 RX ORDER — CARVEDILOL 12.5 MG/1
12.5 TABLET ORAL 2 TIMES DAILY
Qty: 180 TABLET | Refills: 3 | Status: SHIPPED | OUTPATIENT
Start: 2022-09-10

## 2023-02-07 ENCOUNTER — OFFICE VISIT (OUTPATIENT)
Dept: CARDIOLOGY CLINIC | Age: 78
End: 2023-02-07
Payer: MEDICARE

## 2023-02-07 VITALS
HEIGHT: 61 IN | HEART RATE: 68 BPM | BODY MASS INDEX: 35.5 KG/M2 | SYSTOLIC BLOOD PRESSURE: 138 MMHG | WEIGHT: 188 LBS | DIASTOLIC BLOOD PRESSURE: 56 MMHG

## 2023-02-07 DIAGNOSIS — I10 ESSENTIAL HYPERTENSION: ICD-10-CM

## 2023-02-07 DIAGNOSIS — M06.09 RHEUMATOID ARTHRITIS OF MULTIPLE SITES WITH NEGATIVE RHEUMATOID FACTOR (HCC): ICD-10-CM

## 2023-02-07 DIAGNOSIS — I50.32 CHRONIC DIASTOLIC CONGESTIVE HEART FAILURE (HCC): ICD-10-CM

## 2023-02-07 DIAGNOSIS — N18.31 STAGE 3A CHRONIC KIDNEY DISEASE (HCC): ICD-10-CM

## 2023-02-07 DIAGNOSIS — M25.473 ANKLE SWELLING DETERMINED BY EXAMINATION: Primary | ICD-10-CM

## 2023-02-07 DIAGNOSIS — E66.01 SEVERE OBESITY (BMI 35.0-39.9) WITH COMORBIDITY (HCC): ICD-10-CM

## 2023-02-07 DIAGNOSIS — R09.89 BRUIT OF RIGHT CAROTID ARTERY: ICD-10-CM

## 2023-02-07 DIAGNOSIS — E78.5 DYSLIPIDEMIA: ICD-10-CM

## 2023-02-07 DIAGNOSIS — I25.10 ASCVD (ARTERIOSCLEROTIC CARDIOVASCULAR DISEASE): ICD-10-CM

## 2023-02-07 PROBLEM — D64.9 ANEMIA: Status: RESOLVED | Noted: 2018-04-22 | Resolved: 2023-02-07

## 2023-02-07 PROBLEM — N17.1 ACUTE RENAL FAILURE WITH ACUTE CORTICAL NECROSIS (HCC): Status: RESOLVED | Noted: 2017-08-29 | Resolved: 2023-02-07

## 2023-02-07 PROCEDURE — G8417 CALC BMI ABV UP PARAM F/U: HCPCS | Performed by: NURSE PRACTITIONER

## 2023-02-07 PROCEDURE — 1090F PRES/ABSN URINE INCON ASSESS: CPT | Performed by: NURSE PRACTITIONER

## 2023-02-07 PROCEDURE — G8399 PT W/DXA RESULTS DOCUMENT: HCPCS | Performed by: NURSE PRACTITIONER

## 2023-02-07 PROCEDURE — 3078F DIAST BP <80 MM HG: CPT | Performed by: NURSE PRACTITIONER

## 2023-02-07 PROCEDURE — 1123F ACP DISCUSS/DSCN MKR DOCD: CPT | Performed by: NURSE PRACTITIONER

## 2023-02-07 PROCEDURE — 3075F SYST BP GE 130 - 139MM HG: CPT | Performed by: NURSE PRACTITIONER

## 2023-02-07 PROCEDURE — G8427 DOCREV CUR MEDS BY ELIG CLIN: HCPCS | Performed by: NURSE PRACTITIONER

## 2023-02-07 PROCEDURE — 1036F TOBACCO NON-USER: CPT | Performed by: NURSE PRACTITIONER

## 2023-02-07 PROCEDURE — 99214 OFFICE O/P EST MOD 30 MIN: CPT | Performed by: NURSE PRACTITIONER

## 2023-02-07 PROCEDURE — G8484 FLU IMMUNIZE NO ADMIN: HCPCS | Performed by: NURSE PRACTITIONER

## 2023-02-07 RX ORDER — DOCUSATE SODIUM 100 MG/1
100 CAPSULE, LIQUID FILLED ORAL 2 TIMES DAILY
COMMUNITY

## 2023-02-07 RX ORDER — CALCIUM POLYCARBOPHIL 625 MG 625 MG/1
625 TABLET ORAL DAILY
COMMUNITY

## 2023-02-07 ASSESSMENT — ENCOUNTER SYMPTOMS
COUGH: 0
SHORTNESS OF BREATH: 0

## 2023-02-07 NOTE — PROGRESS NOTES
CARDIOLOGY  NOTE    2023    Nicolas Zayas (:  1945) is a 76 y.o. female,an established patient with Dr. Ingrid Alegre, here for evaluation of the following chief complaint(s):  6 Month Follow-Up (Pt denies any new cardiac sx. No upcoming surgeries or procedures. )        SUBJECTIVE/OBJECTIVE:    CASSANDRA Chapa has history as noted below. Stephanie Chapa is overall doing very well denies any new complaints no shortness of breath no chest pain   she is compliant with her compression stockings leg swelling is better but occasionally notices ankle swelling. She also Sees Dr Sarah Villatoro for nephrology for CKD    She underwent PCI of the LAD in 2018 when she presented to [facility] and reduced EF . She was switched to plavix due to cost issues . Currently only on Plavix. Unfortunately, She does not walk or do any exercise      Review of Systems   Constitutional:  Negative for fatigue and fever. Respiratory:  Negative for cough and shortness of breath. Cardiovascular:  Positive for leg swelling. Negative for chest pain and palpitations. Musculoskeletal:  Negative for arthralgias and gait problem. Neurological:  Negative for dizziness, syncope, weakness, light-headedness and headaches. Vitals:    23 0851 23 0854   BP: (!) 148/58 (!) 138/56   Site: Left Upper Arm Left Upper Arm   Position: Sitting Sitting   Cuff Size: Large Adult Large Adult   Pulse: 68    Weight: 188 lb (85.3 kg)    Height: 5' 1\" (1.549 m)        Wt Readings from Last 3 Encounters:   23 188 lb (85.3 kg)   22 183 lb 6.4 oz (83.2 kg)   22 182 lb 6.4 oz (82.7 kg)       BP Readings from Last 3 Encounters:   23 (!) 138/56   22 (!) 110/54   22 124/62       Prior to Admission medications    Medication Sig Start Date End Date Taking?  Authorizing Provider   polycarbophil (FIBERCON) 625 MG tablet Take 625 mg by mouth daily   Yes Historical Provider, MD   Cholecalciferol 50 MCG (2000) CAPS Take 50 mcg by mouth daily Take one tablet by mouth daily/   Yes Historical Provider, MD   docusate sodium (COLACE) 100 MG capsule Take 100 mg by mouth 2 times daily   Yes Historical Provider, MD   carvedilol (COREG) 12.5 MG tablet Take 1 tablet by mouth 2 times daily 9/10/22  Yes SETH Negron CNP   clopidogrel (PLAVIX) 75 MG tablet Take 1 tablet by mouth daily 3/9/22  Yes SETH Negron CNP   losartan (COZAAR) 100 MG tablet Take 1 tablet by mouth daily 1/11/22  Yes John Stoll MD   potassium chloride (KLOR-CON M) 20 MEQ extended release tablet Take 1 tablet by mouth daily 8/31/21  Yes Haider Armijo MD   atorvastatin (LIPITOR) 80 MG tablet Take 80 mg by mouth daily   Yes Historical Provider, MD   ferrous sulfate (IRON 325) 325 (65 Fe) MG tablet Take 325 mg by mouth daily    Yes Historical Provider, MD   NIFEdipine (ADALAT CC) 60 MG extended release tablet Take 1 tablet by mouth daily 12/15/20  Yes Gretchen Godoy MD   chlorthalidone (HYGROTON) 25 MG tablet Take 1 tablet by mouth daily 12/14/20  Yes Gretchen Godoy MD   Compression Stockings MISC by Does not apply route 15 to 20 mmHG 3/12/19  Yes John Stoll MD   hydroxychloroquine (PLAQUENIL) 200 MG tablet Take 200 mg by mouth daily   Yes Historical Provider, MD   nitroGLYCERIN (NITROSTAT) 0.4 MG SL tablet up to max of 3 total doses. If no relief after 1 dose, call 911. Patient not taking: Reported on 2/7/2023 4/23/18   Solitario Pérez MD       Physical Exam  Vitals reviewed. Constitutional:       General: She is not in acute distress. Appearance: Normal appearance. She is obese. She is not ill-appearing. HENT:      Head: Atraumatic. Neck:      Vascular: No carotid bruit. Cardiovascular:      Rate and Rhythm: Normal rate and regular rhythm. Pulses: Normal pulses. Heart sounds: Normal heart sounds. No murmur heard. Pulmonary:      Effort: Pulmonary effort is normal. No respiratory distress.       Breath sounds: Normal breath sounds. Musculoskeletal:         General: No swelling or deformity. Cervical back: Neck supple. No muscular tenderness. Neurological:      Mental Status: She is alert. Health Maintenance   Topic Date Due    Depression Screen  Never done    Hepatitis C screen  Never done    DTaP/Tdap/Td vaccine (1 - Tdap) Never done    Shingles vaccine (2 of 3) 01/18/2015    Annual Wellness Visit (AWV)  Never done    COVID-19 Vaccine (4 - Booster for Moderna series) 01/18/2022    Lipids  07/19/2023    DEXA (modify frequency per FRAX score)  Completed    Flu vaccine  Completed    Pneumococcal 65+ years Vaccine  Completed    Hepatitis A vaccine  Aged Out    Hib vaccine  Aged Out    Meningococcal (ACWY) vaccine  Aged Out       Lab Review   Lab Results   Component Value Date/Time    CHOL 136 07/19/2022 07:17 AM    TRIG 55 07/19/2022 07:17 AM    HDL 69 07/19/2022 07:17 AM        Cath 4/22/18   Procedure Summary   Radial Access   S/p PCI for 99 % mid and 80 % proximal LAD lesion using   overlapping BART 3.25 X38 inflated to 14 atms and 3.5 X 12   inflated 12 bam   Circ is dominant   RCA is non dominant   EF is > 55%   LMCA: short almost immediately bifurcates into Circ and LAD    LAD: 99 % mid lesion , there is diffuse 70 % to 80 % proximal hazy lesion just  before diagonal take off     LCx: dominant large vessel , gives large OM widely patent  RCA: non dominant small vessel   echo 4/23/18   Summary   Left ventricular function is abnormal,septal and apical wall akinesis   estimated EF is 35 to 40 %   Mild concentric left ventricular hypertrophy. Grade III diastolic dysfunction. Mildly dilated left atrium. Right ventricular systolic pressure of 31 mm Hg. Mild mitral regurgitation is present. Mild tricuspid regurgitation. No evidence of any pericardial effusion. Echo 6/11/18   Summary   Limited Study to evaluate LV function   Left Ventricular size is Normal .   LV function is normal , EF 50-55%. EF has improved since last echo in April 2018   No regional wall motion abnormality. Mildly dilated left atrium. No evidence of pericardial effusion. Vein doppler 3/27/19  Summary        No evidence of DVT or SVT in the bilateral common femoral vein, femoral    vein, popliteal vein, greater saphenous vein or small saphenous vein. Significant reflux noted of the Right GSV at the level of SFJ (2.4s). Significant reflux noted in the Left CFV and the GSV at the level of the SFJ    (9.0s). Recommendations        Advice thigh high pressure stockings, 20 to 30 mm of Hg. Keep feet elevated. Increase walking. ASSESSMENT/PLAN:  ASCVD (arteriosclerotic cardiovascular disease)  Status post overlapping stent to proximal LAD for 90% lesion/thrombus On 4/22/18. continue only plavix. She denies angina - currently angina class II,   Continue lipitor 80 mg daily      Congestive heart failure ([address]  She appears euvolemic. Repeat echo shows improved EF  50-55% After lad intervention . Continue coreg 12.5 mg BID, losartan 100 mg Daily, chlorthalidone 25 mg Daily. No MRA SLGT2 or ICD as EF has returned to normal.      Ankle swelling determined by examination  Doppler shows significant  Reflux  Continue to use compression stockings. she refused venous ablation as the compression stockings have given her relief. Carotid Bruit  Carotid doppler were normal in Jan 2021     CKD  Sees Dr Laquita Miner hypertension  Probably controlled. She just took her medications. Reviewed goal BP < 130/80. continue nifedipine to 60 mg daily . coreg 12.5 mg bid, chlorthalidone 25 mg,   losartan 100 mg daily     RA  Uses hydrochloriquin 200 mg daily and feels it is stable. Dr. Rohan Truong sees her every 6-8 weeks for close monitoring. Dyslipidemia   Akilah Guillermo had lab work recently,  Lipid profile was reviewed with patient. continue high-dose statins , continue lipitor   She had labs with  Mabel Jacksonving yesterday will try to get a copy,     Counseled extensively and medication compliance urged. We discussed that for the  prevention of ASCVD our  goal is aggressive risk modification. Patient is encouraged to exercise even a brisk walk for 30 minutes  at least 3 to 4 times a week   Various goals were discussed and questions answered. Continue current medications. Appropriate prescriptions are addressed and refills ordered. Questions answered and patient verbalizes understanding. Call for any problems, questions, or concerns. Return in about 6 months (around 8/7/2023). An electronic signature was used to authenticate this note.     Electronically signed by SETH Begum CNP on 2/7/2023 at 9:01 AM

## 2023-02-07 NOTE — PATIENT INSTRUCTIONS
Please be informed that if you contact our office outside of normal business hours the physician on call cannot help with any scheduling or rescheduling issues, procedure instruction questions or any type of medication issue. We advise you for any urgent/emergency that you go to the nearest emergency room! PLEASE CALL OUR OFFICE DURING NORMAL BUSINESS HOURS    Monday - Friday   8 am to 5 pm    [address] Mel[address]: [phone]    [place]:  [phone]    **It is YOUR responsibilty to bring medication bottles and/or updated medication list to [address] [place] [address]. This will allow us to better serve you and all your healthcare needs**    [facility] Locations - No appointment necessary. Sites open Monday to Friday. Call your preferred location for test preparation, business   hours and other information you need. [employer] [facility] [employer]. [address] [place], [address] [facility]  Phone: [phone] [place]  [address] [facility]  [address] [place], [address] [facility]  Phone: [phone]       Thank you for allowing us to care for you today! We want to ensure we can follow your treatment plan and we strive to give you the best outcomes and experience possible. If you ever have a life threatening emergency and call 911 - for an ambulance (EMS)   Our providers can only care for you at:   [facility] or [facility]. Even if you have someone take you or you drive yourself we can only care for you in a [facility]. Our providers are not setup at the other healthcare locations!

## 2023-02-09 ENCOUNTER — TRANSCRIBE ORDERS (OUTPATIENT)
Dept: ADMINISTRATIVE | Age: 78
End: 2023-02-09

## 2023-02-09 DIAGNOSIS — Z12.31 ENCOUNTER FOR SCREENING MAMMOGRAM FOR BREAST CANCER: Primary | ICD-10-CM

## 2023-02-09 DIAGNOSIS — M85.80 OSTEOPENIA, UNSPECIFIED LOCATION: ICD-10-CM

## 2023-02-09 DIAGNOSIS — M85.80 MELORHEOSTOSIS: ICD-10-CM

## 2023-03-02 RX ORDER — CLOPIDOGREL BISULFATE 75 MG/1
75 TABLET ORAL DAILY
Qty: 90 TABLET | Refills: 3 | Status: SHIPPED | OUTPATIENT
Start: 2023-03-02

## 2023-03-13 ENCOUNTER — HOSPITAL ENCOUNTER (OUTPATIENT)
Dept: WOMENS IMAGING | Age: 78
Discharge: HOME | End: 2023-03-13
Payer: MEDICARE

## 2023-03-13 DIAGNOSIS — M85.88 OSTEOPENIA OF OTHER SITE: ICD-10-CM

## 2023-03-13 DIAGNOSIS — Z12.31 ENCOUNTER FOR SCREENING MAMMOGRAM FOR BREAST CANCER: ICD-10-CM

## 2023-03-13 PROCEDURE — 77067 SCR MAMMO BI INCL CAD: CPT

## 2023-03-13 PROCEDURE — 77080 DXA BONE DENSITY AXIAL: CPT

## 2023-07-13 NOTE — FLOWSHEET NOTE
Lab did not arrive to draw am labs after speaking with lab x2. This RN attempted lab draw again with success. DISPLAY PLAN FREE TEXT DISPLAY PLAN FREE TEXT

## 2023-08-08 ENCOUNTER — OFFICE VISIT (OUTPATIENT)
Dept: CARDIOLOGY CLINIC | Age: 78
End: 2023-08-08
Payer: MEDICARE

## 2023-08-08 VITALS
HEART RATE: 71 BPM | WEIGHT: 191.4 LBS | DIASTOLIC BLOOD PRESSURE: 78 MMHG | HEIGHT: 60 IN | SYSTOLIC BLOOD PRESSURE: 132 MMHG | BODY MASS INDEX: 37.58 KG/M2

## 2023-08-08 DIAGNOSIS — N18.31 STAGE 3A CHRONIC KIDNEY DISEASE (HCC): ICD-10-CM

## 2023-08-08 DIAGNOSIS — I25.10 ASCVD (ARTERIOSCLEROTIC CARDIOVASCULAR DISEASE): ICD-10-CM

## 2023-08-08 DIAGNOSIS — E78.5 DYSLIPIDEMIA: ICD-10-CM

## 2023-08-08 DIAGNOSIS — M25.473 ANKLE SWELLING DETERMINED BY EXAMINATION: ICD-10-CM

## 2023-08-08 DIAGNOSIS — M06.09 RHEUMATOID ARTHRITIS OF MULTIPLE SITES WITH NEGATIVE RHEUMATOID FACTOR (HCC): Primary | ICD-10-CM

## 2023-08-08 DIAGNOSIS — I20.9 ANGINA PECTORIS, UNSPECIFIED (HCC): ICD-10-CM

## 2023-08-08 DIAGNOSIS — R09.89 BRUIT OF RIGHT CAROTID ARTERY: ICD-10-CM

## 2023-08-08 DIAGNOSIS — I25.119 ATHEROSCLEROSIS OF NATIVE CORONARY ARTERY OF NATIVE HEART WITH ANGINA PECTORIS (HCC): ICD-10-CM

## 2023-08-08 DIAGNOSIS — I50.32 CHRONIC DIASTOLIC CONGESTIVE HEART FAILURE (HCC): ICD-10-CM

## 2023-08-08 DIAGNOSIS — I10 ESSENTIAL HYPERTENSION: ICD-10-CM

## 2023-08-08 PROCEDURE — G8399 PT W/DXA RESULTS DOCUMENT: HCPCS | Performed by: INTERNAL MEDICINE

## 2023-08-08 PROCEDURE — 99214 OFFICE O/P EST MOD 30 MIN: CPT | Performed by: INTERNAL MEDICINE

## 2023-08-08 PROCEDURE — 1090F PRES/ABSN URINE INCON ASSESS: CPT | Performed by: INTERNAL MEDICINE

## 2023-08-08 PROCEDURE — G8427 DOCREV CUR MEDS BY ELIG CLIN: HCPCS | Performed by: INTERNAL MEDICINE

## 2023-08-08 PROCEDURE — 1036F TOBACCO NON-USER: CPT | Performed by: INTERNAL MEDICINE

## 2023-08-08 PROCEDURE — 3078F DIAST BP <80 MM HG: CPT | Performed by: INTERNAL MEDICINE

## 2023-08-08 PROCEDURE — 3075F SYST BP GE 130 - 139MM HG: CPT | Performed by: INTERNAL MEDICINE

## 2023-08-08 PROCEDURE — G8417 CALC BMI ABV UP PARAM F/U: HCPCS | Performed by: INTERNAL MEDICINE

## 2023-08-08 PROCEDURE — 1123F ACP DISCUSS/DSCN MKR DOCD: CPT | Performed by: INTERNAL MEDICINE

## 2023-08-08 NOTE — PATIENT INSTRUCTIONS
We are committed to providing you the best care possible. If you receive a survey after visiting one of our offices, please take time to share your experience concerning your physician office visit. These surveys are confidential and no health information about you is shared. We are eager to improve for you and we are counting on your feedback to help make that happen. **It is YOUR responsibilty to bring medication bottles and/or updated medication list to [address]. This will allow us to better serve you and all your healthcare needs**    Thank you for allowing us to care for you today! We want to ensure we can follow your treatment plan and we strive to give you the best outcomes and experience possible. If you ever have a life threatening emergency and call 911 - for an ambulance (EMS)   Our providers can only care for you at:   [facility] or [facility]. Even if you have someone take you or you drive yourself we can only care for you in a [facility]. Our providers are not setup at the other healthcare locations!

## 2023-08-08 NOTE — PROGRESS NOTES
bilateral common femoral vein, femoral    vein, popliteal vein, greater saphenous vein or small saphenous vein. Significant reflux noted of the Right GSV at the level of SFJ (2.4s). Significant reflux noted in the Left CFV and the GSV at the level of the SFJ    (9.0s). Recommendations        Advice thigh high pressure stockings, 20 to 30 mm of Hg. Keep feet elevated. Increase walking. OV in 6 weeks             All labs, medications and tests reviewed by myself including data and history from outside source , patient and available family . Assessment & Plan:      1. Rheumatoid arthritis of multiple sites with negative rheumatoid factor ([address])    2. Angina pectoris, unspecified    3. Atherosclerosis of native coronary artery of native heart with angina pectoris ([address])    4. Essential hypertension    5. Dyslipidemia    6. Chronic diastolic congestive heart failure ([address])    7. Bruit of right carotid artery    8. ASCVD (arteriosclerotic cardiovascular disease)    9. Ankle swelling determined by examination    10. Stage 3a chronic kidney disease (HCC)           ASCVD (arteriosclerotic cardiovascular disease)  Status post overlapping stent to proximal LAD for 90% lesion/thrombus On 4/22/18. continue only plavix , no aspirin no angina doing well, currently angina class II , LDL is in 50's on lipitor 80 mg daily     Congestive heart failure ([address])  She appears euvolemic. She says her ankles swell  Repeat echo shows improved EF  50-55% After lad intervention . She is taking coreg 25 mg once a day , asked her to take coreg 25mg twice a day instead  Continue losartan 100 mg daily . She does not appear to retain water. Ankles swelling multifactorial     Ankle swelling determined by examination  Doppler shows significant  Reflux, she feels better after socks, continue to  wear compression stockings she refused venous ablation    Carotid Bruit  Carotid doppler were normal in Jan 2021    CKD  Sees Dr Eddie Murdock

## 2024-02-27 ENCOUNTER — OFFICE VISIT (OUTPATIENT)
Dept: CARDIOLOGY CLINIC | Age: 79
End: 2024-02-27
Payer: MEDICARE

## 2024-02-27 VITALS
BODY MASS INDEX: 35.5 KG/M2 | WEIGHT: 188 LBS | OXYGEN SATURATION: 99 % | HEART RATE: 66 BPM | DIASTOLIC BLOOD PRESSURE: 70 MMHG | SYSTOLIC BLOOD PRESSURE: 140 MMHG | HEIGHT: 61 IN

## 2024-02-27 DIAGNOSIS — N18.31 STAGE 3A CHRONIC KIDNEY DISEASE (HCC): ICD-10-CM

## 2024-02-27 DIAGNOSIS — I25.10 ASCVD (ARTERIOSCLEROTIC CARDIOVASCULAR DISEASE): ICD-10-CM

## 2024-02-27 DIAGNOSIS — I10 ESSENTIAL HYPERTENSION: Primary | ICD-10-CM

## 2024-02-27 DIAGNOSIS — I25.119 ATHEROSCLEROSIS OF NATIVE CORONARY ARTERY OF NATIVE HEART WITH ANGINA PECTORIS (HCC): ICD-10-CM

## 2024-02-27 DIAGNOSIS — I20.9 ANGINA PECTORIS, UNSPECIFIED (HCC): ICD-10-CM

## 2024-02-27 DIAGNOSIS — I50.32 CHRONIC DIASTOLIC CONGESTIVE HEART FAILURE (HCC): ICD-10-CM

## 2024-02-27 DIAGNOSIS — M25.473 ANKLE SWELLING DETERMINED BY EXAMINATION: ICD-10-CM

## 2024-02-27 DIAGNOSIS — R09.89 BRUIT OF RIGHT CAROTID ARTERY: ICD-10-CM

## 2024-02-27 DIAGNOSIS — E78.5 DYSLIPIDEMIA: ICD-10-CM

## 2024-02-27 PROCEDURE — 3077F SYST BP >= 140 MM HG: CPT | Performed by: INTERNAL MEDICINE

## 2024-02-27 PROCEDURE — G8417 CALC BMI ABV UP PARAM F/U: HCPCS | Performed by: INTERNAL MEDICINE

## 2024-02-27 PROCEDURE — G8427 DOCREV CUR MEDS BY ELIG CLIN: HCPCS | Performed by: INTERNAL MEDICINE

## 2024-02-27 PROCEDURE — 99214 OFFICE O/P EST MOD 30 MIN: CPT | Performed by: INTERNAL MEDICINE

## 2024-02-27 PROCEDURE — G8484 FLU IMMUNIZE NO ADMIN: HCPCS | Performed by: INTERNAL MEDICINE

## 2024-02-27 PROCEDURE — 1123F ACP DISCUSS/DSCN MKR DOCD: CPT | Performed by: INTERNAL MEDICINE

## 2024-02-27 PROCEDURE — 93000 ELECTROCARDIOGRAM COMPLETE: CPT | Performed by: INTERNAL MEDICINE

## 2024-02-27 PROCEDURE — 1036F TOBACCO NON-USER: CPT | Performed by: INTERNAL MEDICINE

## 2024-02-27 PROCEDURE — 3078F DIAST BP <80 MM HG: CPT | Performed by: INTERNAL MEDICINE

## 2024-02-27 PROCEDURE — G8399 PT W/DXA RESULTS DOCUMENT: HCPCS | Performed by: INTERNAL MEDICINE

## 2024-02-27 PROCEDURE — 1090F PRES/ABSN URINE INCON ASSESS: CPT | Performed by: INTERNAL MEDICINE

## 2024-02-27 NOTE — PROGRESS NOTES
2021    CKD  Sees Dr Campos     Essential hypertension  Blood pressures higher at  home according to her blood pressure log,continue nifedipine to 60 mg daily .coreg 25mg bid, continue chlorthalidone 50 mg ,   losartan 100 mg  , nifedipine cd 60 mg daily   check lytes ar normal       Dyslipidemia :  Eun had lab work recently,  Lipid profile was reviewed with patient.continue high-dose statins , continue lipitor will check lipids , repeat labs with Dr britton   On lipitor 40 mg , LDL is in 50      Counseled extensively and medication compliance urged.  We discussed that for the  prevention of ASCVD our  goal is aggressive risk modification.Patient is encouraged to exercise even a brisk walk for 30 minutes  at least 3 to 4 times a week   Various goals were discussed and questions answered. Continue current medications. Appropriate prescriptions are addressed and refills ordered.  Questions answered and patient verbalizes understanding.  Call for any problems, questions, or concerns.    Continue all other medications of all above medical condition listed as is.    No follow-ups on file.    Please note this report has been partially produced using speech recognition software and may contain errors related to that system including errors in grammar, punctuation, and spelling, as well as words and phrases that may be inappropriate. If there are any questions or concerns please feel free to contact the dictating provider for clarification.

## 2024-03-08 ENCOUNTER — TELEPHONE (OUTPATIENT)
Dept: CARDIOLOGY CLINIC | Age: 79
End: 2024-03-08

## 2024-03-08 NOTE — TELEPHONE ENCOUNTER
Nuclear exercise stress test with myocardial perfusion     LV perfusion is normal. There is no evidence of inducible ischemia.ejection fraction was 69%    A Trae protocol stress test was performed. Overall, the patient's exercise capacity was above average for their age. The patient reached stage 3 of the protocol and was stressed for 8 min and 0 sec. Hemodynamics are adequate for diagnosis. Blood pressure demonstrated a normal response and heart rate demonstrated a normal response to stress. The patient's heart rate recovery was normal.Completed 9 METS    Normal stress test     unable to leave msg vm box not set up     Letter sent regarding results.

## 2024-03-18 ENCOUNTER — HOSPITAL ENCOUNTER (OUTPATIENT)
Dept: WOMENS IMAGING | Age: 79
Discharge: HOME | End: 2024-03-18
Attending: FAMILY MEDICINE
Payer: MEDICARE

## 2024-03-18 VITALS — BODY MASS INDEX: 33.99 KG/M2 | WEIGHT: 180 LBS | HEIGHT: 61 IN

## 2024-03-18 DIAGNOSIS — Z12.31 ENCOUNTER FOR SCREENING MAMMOGRAM FOR MALIGNANT NEOPLASM OF BREAST: ICD-10-CM

## 2024-03-18 PROCEDURE — 77063 BREAST TOMOSYNTHESIS BI: CPT

## 2024-08-19 NOTE — CONSULTS
Nephrology Service Consultation    Patient:  Sandhya Gautam  MRN: [id number]  Consulting physician:  Kayla Smith MD  Reason for Consult:  UYEN on stage 3A CKD     History Obtained From:  patient  PCP: Traci Gleason MD    HISTORY OF PRESENT ILLNESS:   The patient is a 66 y.o. female who was transported   to [facility] ED on 12/9 via her brother. It is reported that  Fátima Mckay had been experiencing non-focal abdominal pain   For approximately 4 days without nausea and vomiting. She had reported feeling constipated with a small BM on 12/9    REVIEW OF SYSTEMS:  The ten point review of systems   are negative except as mentioned above. Medical History: Diastolic Heart Failure / CAD   RA (on plaquenil / no biologics) / HTN (2011)    Surgical History: PCI / LAD (2018) d/t NTSEMI and low EF   Colonoscopy (circa 2017)    Renal History: stage 3A CKD with estimated baseline creatinine: 1.3  -no known history of nephrolithiasis / denies having RRT      SOCIAL HISTORY:   Tobacco: denies use     Alcohol: denies use     Recreational Drug Use: denies use     Demographic History; prior to admission: residing at home alone    Review of pertinent lab work and diagnostics available thus far:   Review of non-contrast CT of abdomen / pelvis from 12/9:   1. Small amount of free air present within the abdomen which is most   consistent with perforated viscus.  The exact origin of the free air is   indeterminate, however, the patient does have diverticulosis with stranding   noted throughout the pelvis and acute diverticulitis with perforation is   favored.  No well-defined organized fluid identified at this time.  A small   amount of free fluid is present within the pelvis.    2. Dilated fluid-filled loops of small bowel also present throughout the   abdomen without well-defined transition point identified.  There is tapering   of the small bowel distally and findings favored to reflect ileus, however,   partial small bowel Patient : Alysia Rider Age: 31 year old Sex: female   MRN: [id number] Encounter Date: 8/19/2024      History     Chief Complaint   Patient presents with    Abdominal Pain     HPI  31 year old female with a medical history of chronic pancreatitis presents  to the ED for abdominal pain.  Patient states that over the last 5 days she has had intermittent epigastric abdominal pain with nausea and vomiting.  She states she was seen last night for the same thing no subsequently discharged.  The pain came back.  No fevers or chills.  Has tried to schedule follow-up with her GI but has been unable to.  Has a cholecystectomy.  No black or bloody stools no urinary or vaginal symptoms    Past Medical History:   Diagnosis Date    Alcoholic pancreatitis (CMD)     Allergy     Anxiety     Asthma (CMD)     Chronic pain     abd and back and chest    Dysphagia     Essential (primary) hypertension     Fracture     right foot    GERD (gastroesophageal reflux disease)     Pancreatitis (CMD)     pancreatic cyst    Pneumonia     RAD (reactive airway disease) (CMD)     Seizures  (CMD)     12 or 13 years ago-no longer has them    Sinusitis, chronic     allergies       Allergies   Allergen Reactions    Bee Sting SWELLING       Past Surgical History:   Procedure Laterality Date    CHOLECYSTECTOMY N/A 07/20/2023    ECTOPIC PREGNANCY SURGERY  2007    ENDOSCOPIC RETROGRADE CHOLANGIOPANCREATOGRAPHY (ERCP)  03/16/2023    ESOPHAGOGASTRODUODENOSCOPY TRANSORAL FLEX W/BX SINGLE OR MULT      PANCREATIC PSEUDOCYST DRAINAGE  02/2023    egd/eus    TUBAL LIGATION  2017       Discharge Medication List as of 8/19/2024  2:39 PM        Prior to Admission Medications    Details   oxyCODONE-acetaminophen (Percocet) 5-325 MG per tablet Indication: Acute PainDelegates - In compliance with state law, the Prescription Drug Monitoring Program must be reviewed prior to signing any order for a controlled substance. PDMP Reviewed by Delegate - No Unexpected ActivityTake 1  obstruction cannot entirely be excluded.      Medications:   Scheduled Meds:   metroNIDAZOLE  500 mg Intravenous Q8H    sodium chloride flush  10 mL Intravenous 2 times per day    levofloxacin  750 mg Intravenous Q24H     Continuous Infusions:   sodium chloride 100 mL/hr at 12/10/20 0124     PRN Meds:.morphine, sodium chloride flush, acetaminophen **OR** acetaminophen, magnesium hydroxide, promethazine **OR** ondansetron    Allergies:  Pcn [penicillins]    Family History:       Problem Relation Age of Onset    Cancer Mother     Diabetes Mother     Coronary Art Dis Father     Heart Attack Father     Heart Disease Father     Coronary Art Dis Sister     Heart Attack Sister     Heart Disease Sister     Stroke Sister     High Blood Pressure Brother     High Cholesterol Brother     Kidney Disease Brother      Physical Exam:    Vitals: BP (!) 100/46   Pulse 64   Temp 97.8 °F (36.6 °C) (Oral)   Resp 12   Ht 5' 1\" (1.549 m)   Wt 180 lb (81.6 kg)   SpO2 97%   BMI 34.01 kg/m²     General appearance:  awake and verbally interactive   HEENT: Head: Normal, normocephalic, atraumatic. Neck: supple, symmetrical, trachea midline  Cardiovascular: S1 and S2: normal / no rub  Pulmonary: diminished lung sounds bilaterally  Abdomen:  soft and round / non-tender this am   Extremities: Trace edema to bilateral lower legs     CBC:   Recent Labs     12/09/20  1611   WBC 9.9   HGB 11.4*        BMP:    Recent Labs     12/09/20  1611   *   K 3.5   CL 93*   CO2 21   BUN 40*   CREATININE 3.0*   GLUCOSE 134*     Hepatic:   Recent Labs     12/09/20  1611   AST 21   ALT 14   BILITOT 1.2*   ALKPHOS 63     Troponin: No results for input(s): TROPONINI in the last 72 hours. Mg, Phos:   Recent Labs     12/09/20  1611   MG 2.0       ABGs: No results found for: PHART, PO2ART, NPR5LPS  INR: No results for input(s): INR in the last 72 hours.   -----------------------------------------------------------------  Patient tablet by mouth ev kory 6 hours as needed for Pain (Not to exceed 4000 mg acetaminophen per day).Eprescribe, Disp-13 tablet, R-0      ondansetron (ZOFRAN ODT) 4 MG disintegrating tablet Place 1 tablet onto the tongue every 6 hours as needed for Nausea.Eprescribe, Disp-10 tablet, R-0      albuterol 108 (90 Base) MCG/ACT inhaler Inhale 2 puffs into the lungs every 4 hours as needed for Shortness of Breath or Wheezing.Eprescribe, Disp-25.5 g, R-1      tiZANidine (ZANAFLEX) 4 MG tablet Take 1 tablet by mouth every 8 hours as needed (muscle spasms).Eprescribe, Disp-12 tablet, R-0      metoPROLOL tartrate (LOPRESSOR) 25 MG tablet Take 1 tablet by mouth every 12 hours.Eprescribe, Disp-180 tablet, R-1      naLOXone (NARCAN) 4 MG/0.1ML nasal liquid Spray the content of 1 device into 1 nostril. Call 911. May repeat with 2nd device in alternate nostril if no response in 2-3 minutes.Eprescribe, Disp-2 each, R-1This product is available as 2 devices in 1 box. Please order in multiples of 2.      naLOXone (NARCAN) 4 MG/0.1ML nasal liquid Spray the content of 1 device into 1 nostril. Call 911. May repeat with 2nd device in alternate nostril if no response in 2-3 minutes.Eprescribe, Disp-2 each, R-1This product is available as 2 devices in 1 box. Please order in multiples of 2.      naLOXone (NARCAN) 4 MG/0.1ML nasal liquid Spray the content of 1 device into 1 nostril. Call 911. May repeat with 2nd device in alternate nostril if no response in 2-3 minutes.Eprescribe, Disp-2 each, R-1This product is available as 2 devices in 1 box. Please order in multiples of 2.      guaiFENesin (MUCINEX PO) Take 2 tablets by mouth as needed.Historical Med      pantoprazole (PROTONIX) 40 MG tablet Take 1 tablet by mouth in the morning and 1 tablet in the evening.Eprescribe, Disp-60 tablet, R-0**Patient requests 90 days supply**      Pancrelipase, Lip-Prot-Amyl, (Zenpep) 03502-469577 units per capsule Take 2 capsules by mouth 3 times daily (before  Active Problem List   Diagnosis Code    Essential hypertension I10    Rheumatoid arthritis of multiple sites with negative rheumatoid factor (HCC) M06.09    Dysuria R30.0    Chronic kidney disease, stage III (moderate) N18.30    Acute renal failure with acute cortical necrosis (HCC) N17.1    Congestive heart failure (HCC) I50.9    ASCVD (arteriosclerotic cardiovascular disease) I25.10    Anemia D64.9    Ankle swelling determined by examination M25.473    Dyslipidemia E78.5    Bruit of right carotid artery R09.89    Abdominal pain R10.9      Assessment and Recommendations     Impression  1. UYEN on stage 3A CKD (ATN vs. Pre-renal azotemia)  -likely multi-factorial etiology for UYEN including: intravascular  Volume depletion accentuated by losartan / chlorthalidone.   -cannot exclude infection as contributor to UYEN     2. Perforated viscus   3. CAD   4. Hypotension   5. Diastolic Heart Failure   6. Anemia     PLAN  1.    -bladder scan ordered to screen for urinary retention   -additional labs: BMP qam / UPC / phosphorous   -UA: negative blood / albumin: 30 / moderate leuks   -latest serum creatinine 3.0 with normal K / CO2  -please measure and document all urine output  2.   -referral submitted to general surgery; presently NPO   -presently on flagyl and levofloxacin   3.   -referral submitted to cardiology   4. -BP trend: systolic BP's have recently been 96 - 109  -not currently on routine blood pressure medications or midodrine   5.   -monitor: O2 saturations / urine output and volume status   -no overt physical exam findings to suggest acute fluid overload  6.   -latest Hb: 11.4; follow hemoglobin trend     Electronically signed by SETH Shrestha - CNP      Nephrology Attending Progress Note  12/10/2020 1:04 PM  Subjective: Interval History: I have personally performed face to face diagnostic evaluation on this patient.  I have personally reviewed pertinent labs and imaging and agree with meals).Eprescribe, Disp-180 capsule, R-11             Discharge Medication List as of 2024  2:39 PM          Family History   Problem Relation Age of Onset    Patient is unaware of any medical problems Mother     Diabetes Father     Cancer, Lung Maternal Grandfather         LUNG CANCER    Myocardial Infarction Paternal Uncle        Social History     Tobacco Use    Smoking status: Former     Current packs/day: 0.00     Average packs/day: 0.1 packs/day for 4.0 years (0.4 ttl pk-yrs)     Types: Cigarettes     Start date: 3/8/2014     Quit date: 3/8/2018     Years since quittin.4    Smokeless tobacco: Never    Tobacco comments:     VAPE   Vaping Use    Vaping status: Every Day    Substances: Nicotine   Substance Use Topics    Alcohol use: Yes     Comment: 2 glasses of wine 24    Drug use: Yes     Types: Marijuana     Comment: last use  ( for pain) smokes       Review of Systems  Per HPI        Physical Exam     ED Triage Vitals [24 1022]   ED Triage Vitals Group      Temp 99.1 °F (37.3 °C)      Heart Rate 89      Resp 18      /68      SpO2 99 %      EtCO2 mmHg       Height 5' 2\" (1.575 m)      Weight 139 lb (63 kg)      Weight Scale Used Standing scale      BMI (Calculated) 25.42      IBW/kg (Calculated) 50.1       GENERAL APPEARANCE:  generally well-appearing no acute distress.  HEENT:  NC, AT. MMM. EOMI, clear conjunctiva  NECK:  trachea midline.  No restricted ROM.  HEART:  reg rate and regular rhythm  LUNGS:  Equal chest rise, no accessory muscle usage  ABDOMEN:  Soft, tenderness in the epigastrium no rigidity or guarding, nondistended  EXTREMITIES:  Without deformity or edema.  NEUROLOGICAL:  Grossly nonfocal. Alert and oriented, moving all 4 extremities. Observed to ambulate with normal gait. No facial droop or slurred speech  SKIN:  Warm and dry     Physical Exam        Lab Results     Results for orders placed or performed during the hospital encounter of 24   Comprehensive  Metabolic Panel    Specimen: Blood, Venous   Result Value Ref Range    Fasting Status      Sodium 139 135 - 145 mmol/L    Potassium 3.8 3.4 - 5.1 mmol/L    Chloride 104 97 - 110 mmol/L    Carbon Dioxide 26 21 - 32 mmol/L    Anion Gap 13 7 - 19 mmol/L    Glucose 81 70 - 99 mg/dL    BUN 13 6 - 20 mg/dL    Creatinine 0.69 0.51 - 0.95 mg/dL    Glomerular Filtration Rate >90 >=60    BUN/Cr 19 7 - 25    Calcium 8.9 8.4 - 10.2 mg/dL    Bilirubin, Total 0.3 0.2 - 1.0 mg/dL    GOT/AST 11 <=37 Units/L    GPT/ALT 16 <64 Units/L    Alkaline Phosphatase 68 45 - 117 Units/L    Albumin 3.8 3.6 - 5.1 g/dL    Protein, Total 7.6 6.4 - 8.2 g/dL    Globulin 3.8 2.0 - 4.0 g/dL    A/G Ratio 1.0 1.0 - 2.4   Lipase    Specimen: Blood, Venous   Result Value Ref Range    Lipase 78 (H) 15 - 77 Units/L   Magnesium    Specimen: Blood, Venous   Result Value Ref Range    Magnesium 1.6 (L) 1.7 - 2.4 mg/dL   CBC with Automated Differential (performable only)    Specimen: Blood, Venous   Result Value Ref Range    WBC 13.7 (H) 4.2 - 11.0 K/mcL    RBC 3.82 (L) 4.00 - 5.20 mil/mcL    HGB 11.8 (L) 12.0 - 15.5 g/dL    HCT 35.7 (L) 36.0 - 46.5 %    MCV 93.5 78.0 - 100.0 fl    MCH 30.9 26.0 - 34.0 pg    MCHC 33.1 32.0 - 36.5 g/dL    RDW-CV 13.2 11.0 - 15.0 %    RDW-SD 45.8 39.0 - 50.0 fL     140 - 450 K/mcL    NRBC 0 <=0 /100 WBC    Neutrophil, Percent 84 %    Lymphocytes, Percent 10 %    Mono, Percent 5 %    Eosinophils, Percent 1 %    Basophils, Percent 0 %    Immature Granulocytes 0 %    Absolute Neutrophils 11.5 (H) 1.8 - 7.7 K/mcL    Absolute Lymphocytes 1.3 1.0 - 4.8 K/mcL    Absolute Monocytes 0.7 0.3 - 0.9 K/mcL    Absolute Eosinophils  0.1 0.0 - 0.5 K/mcL    Absolute Basophils 0.0 0.0 - 0.3 K/mcL    Absolute Immature Granulocytes 0.1 0.0 - 0.2 K/mcL       EKG Results       Radiology Results     Imaging Results    None         ED Medication Orders (From admission, onward)      Ordered Start     Status Ordering Provider    08/19/24 9738  the care plan above. My additional findings are as follows: The patient is a 66 y.o. female who presents with abd pain and ct scan with perforated viscus in setting arf on ckd with chf and htn known to me.      Objective:   Vitals: BP (!) 118/47   Pulse 81   Temp 97.9 °F (36.6 °C) (Oral)   Resp 17   Ht 5' 1\" (1.549 m)   Wt 180 lb (81.6 kg)   SpO2 100%   BMI 34.01 kg/m²   Weak awake  Soft mild tender  Trace edema    Assessment and Plan:    1 bp stable  2 fu dr Garcia Agent rec with preforated viscus  3 maintain on abx therapy  4 maintain with zavala  5 on abx therapy no fever  6 hb stable  7 arf from atn/pra and maintain ivf for now and monitor  8 na low stable       Electronically signed by Nirmala Tan MD on 12/10/2020 at 1:04 PM 08/19/24 1325  HYDROmorphone (DILAUDID) injection 1 mg  ONCE         Last MAR action: Given SMALL GRAYSON    08/19/24 1153 08/19/24 1154  HYDROmorphone (DILAUDID) injection 1 mg  ONCE         Last MAR action: Given SMALLELLEC    08/19/24 1100 08/19/24 1101  sodium chloride (NORMAL SALINE) 0.9 % bolus 1,000 mL  ONCE         Last MAR action: Completed SMALL GRAYSON    08/19/24 1100 08/19/24 1101  ondansetron (ZOFRAN) injection 4 mg  ONCE         Last MAR action: Given SMALL GRAYSON    08/19/24 1100 08/19/24 1101  famotidine (PEPCID) injection 20 mg  ONCE         Last MAR action: Not Given SMALL, GRAYSON                Medical Decision Making    Patient presents as above.  Vital signs stable.  History obtained from patient.  Chart review by me shows visit to the ER yesterday with also CT imaging showing likely acute on chronic pancreatitis.  Labs and imaging were ordered interpreted by me  Lipase is elevated at 73 and she has a small leukocytosis is likely reactive from her nausea and vomiting.  CT showed no evidence of appendicitis or other infectious etiologies beside the pancreatitis done yesterday.  Patient was treated with multimodal symptom control and on reevaluation she did have some improvement.  She was redosed 1 time.  While in the ER she was able to tolerate p.o.  Did have a discussion about admission for pancreatitis however patient declines at this time she would prefer to go home and follow-up with her GI doctor.  I did alert patient's GI doctor to her multiple visits.  Patient discharged in stable condition      ED Course       ED Course as of 08/19/24 1450   Mon Aug 19, 2024   1337 Reeval patient still with some pain will redose Dilaudid and attempt p.o. challenge [AS]      ED Course User Index  [AS] Elle Hamc, DO         Procedures    Clinical Impression     ED Diagnosis   1. Chronic pancreatitis, unspecified pancreatitis type  (CMD)            Disposition        Discharge 8/19/2024  2:39 PM  Alysia Rider  discharge to home/self care.                Grayson Ham DO  Emergency Medicine     Grayson Ham DO  08/19/24 2701

## 2024-09-03 ENCOUNTER — OFFICE VISIT (OUTPATIENT)
Dept: CARDIOLOGY CLINIC | Age: 79
End: 2024-09-03
Payer: MEDICARE

## 2024-09-03 VITALS
BODY MASS INDEX: 35.12 KG/M2 | SYSTOLIC BLOOD PRESSURE: 142 MMHG | HEART RATE: 61 BPM | WEIGHT: 186 LBS | OXYGEN SATURATION: 99 % | HEIGHT: 61 IN | DIASTOLIC BLOOD PRESSURE: 70 MMHG

## 2024-09-03 DIAGNOSIS — I25.10 ASCVD (ARTERIOSCLEROTIC CARDIOVASCULAR DISEASE): Primary | ICD-10-CM

## 2024-09-03 DIAGNOSIS — I20.9 ANGINA PECTORIS, UNSPECIFIED (HCC): ICD-10-CM

## 2024-09-03 DIAGNOSIS — I50.32 CHRONIC DIASTOLIC CONGESTIVE HEART FAILURE (HCC): ICD-10-CM

## 2024-09-03 DIAGNOSIS — I10 ESSENTIAL HYPERTENSION: ICD-10-CM

## 2024-09-03 DIAGNOSIS — E78.5 DYSLIPIDEMIA: ICD-10-CM

## 2024-09-03 DIAGNOSIS — N18.31 STAGE 3A CHRONIC KIDNEY DISEASE (HCC): ICD-10-CM

## 2024-09-03 DIAGNOSIS — I25.119 ATHEROSCLEROSIS OF NATIVE CORONARY ARTERY OF NATIVE HEART WITH ANGINA PECTORIS (HCC): ICD-10-CM

## 2024-09-03 DIAGNOSIS — R09.89 BRUIT OF RIGHT CAROTID ARTERY: ICD-10-CM

## 2024-09-03 DIAGNOSIS — M25.473 ANKLE SWELLING DETERMINED BY EXAMINATION: ICD-10-CM

## 2024-09-03 LAB
CHOLESTEROL, TOTAL: 146 MG/DL
CHOLESTEROL/HDL RATIO: NORMAL
HDLC SERPL-MCNC: 68 MG/DL (ref 35–70)
LDL CHOLESTEROL: 62
NONHDLC SERPL-MCNC: NORMAL MG/DL
TRIGL SERPL-MCNC: 85 MG/DL
VLDLC SERPL CALC-MCNC: 16 MG/DL

## 2024-09-03 PROCEDURE — 3077F SYST BP >= 140 MM HG: CPT | Performed by: INTERNAL MEDICINE

## 2024-09-03 PROCEDURE — 1036F TOBACCO NON-USER: CPT | Performed by: INTERNAL MEDICINE

## 2024-09-03 PROCEDURE — 3078F DIAST BP <80 MM HG: CPT | Performed by: INTERNAL MEDICINE

## 2024-09-03 PROCEDURE — G8399 PT W/DXA RESULTS DOCUMENT: HCPCS | Performed by: INTERNAL MEDICINE

## 2024-09-03 PROCEDURE — 99214 OFFICE O/P EST MOD 30 MIN: CPT | Performed by: INTERNAL MEDICINE

## 2024-09-03 PROCEDURE — 1090F PRES/ABSN URINE INCON ASSESS: CPT | Performed by: INTERNAL MEDICINE

## 2024-09-03 PROCEDURE — G8427 DOCREV CUR MEDS BY ELIG CLIN: HCPCS | Performed by: INTERNAL MEDICINE

## 2024-09-03 PROCEDURE — G8417 CALC BMI ABV UP PARAM F/U: HCPCS | Performed by: INTERNAL MEDICINE

## 2024-09-03 PROCEDURE — 1123F ACP DISCUSS/DSCN MKR DOCD: CPT | Performed by: INTERNAL MEDICINE

## 2024-09-03 RX ORDER — ATORVASTATIN CALCIUM 40 MG/1
40 TABLET, FILM COATED ORAL DAILY
Qty: 90 TABLET | Refills: 3 | Status: SHIPPED | OUTPATIENT
Start: 2024-09-03

## 2024-09-03 RX ORDER — LOSARTAN POTASSIUM 100 MG/1
100 TABLET ORAL DAILY
Qty: 90 TABLET | Refills: 3 | Status: SHIPPED | OUTPATIENT
Start: 2024-09-03

## 2024-09-03 RX ORDER — CLOPIDOGREL BISULFATE 75 MG/1
75 TABLET ORAL DAILY
Qty: 90 TABLET | Refills: 3 | Status: SHIPPED | OUTPATIENT
Start: 2024-09-03

## 2024-09-03 NOTE — PROGRESS NOTES
SVT in the bilateral common femoral vein, femoral    vein, popliteal vein, greater saphenous vein or small saphenous vein.    Significant reflux noted of the Right GSV at the level of SFJ (2.4s).    Significant reflux noted in the Left CFV and the GSV at the level of the SFJ    (9.0s).        Recommendations        Advice thigh high pressure stockings, 20 to 30 mm of Hg.    Keep feet elevated.    Increase walking.        OV in 6 weeks       Stress test  3/2024  LV perfusion is normal. There is no evidence of inducible ischemia.ejection fraction was 69%    A Trae protocol stress test was performed. Overall, the patient's exercise capacity was above average for their age. The patient reached stage 3 of the protocol and was stressed for 8 min and 0 sec. Hemodynamics are adequate for diagnosis. Blood pressure demonstrated a normal response and heart rate demonstrated a normal response to stress. The patient's heart rate recovery was normal.Completed 9 METS    Normal stress test         All labs, medications and tests reviewed by myself including data and history from outside source , patient and available family .  Assessment & Plan:      1. ASCVD (arteriosclerotic cardiovascular disease)    2. Atherosclerosis of native coronary artery of native heart with angina pectoris (HCC)    3. Bruit of right carotid artery    4. Chronic diastolic congestive heart failure (HCC)    5. Dyslipidemia    6. Essential hypertension    7. Stage 3a chronic kidney disease (HCC)    8. Angina pectoris, unspecified    9. Ankle swelling determined by examination             ASCVD (arteriosclerotic cardiovascular disease)  H/o NSTEMI Status post overlapping stent to proximal LAD for 90% lesion/thrombus On 4/22/18.continue only plavix , no aspirin no angina doing well, currently angina class II , LDL is in 50's on lipitor 80 mg daily   She completed 8 Mins of exercise and had no ischemia on stress test in 2024    Congestive heart failure

## 2025-03-26 ENCOUNTER — TRANSCRIBE ORDERS (OUTPATIENT)
Dept: ADMINISTRATIVE | Age: 80
End: 2025-03-26

## 2025-03-26 DIAGNOSIS — Z13.820 ENCOUNTER FOR SCREENING FOR OSTEOPOROSIS: ICD-10-CM

## 2025-03-26 DIAGNOSIS — Z12.31 ENCOUNTER FOR SCREENING MAMMOGRAM FOR MALIGNANT NEOPLASM OF BREAST: Primary | ICD-10-CM

## 2025-03-26 DIAGNOSIS — M81.0 AGE-RELATED OSTEOPOROSIS WITHOUT CURRENT PATHOLOGICAL FRACTURE: ICD-10-CM

## 2025-03-31 ENCOUNTER — OFFICE VISIT (OUTPATIENT)
Dept: CARDIOLOGY CLINIC | Age: 80
End: 2025-03-31
Payer: MEDICARE

## 2025-03-31 VITALS
WEIGHT: 168.8 LBS | SYSTOLIC BLOOD PRESSURE: 160 MMHG | DIASTOLIC BLOOD PRESSURE: 60 MMHG | HEART RATE: 67 BPM | BODY MASS INDEX: 31.87 KG/M2 | HEIGHT: 61 IN

## 2025-03-31 DIAGNOSIS — N18.32 STAGE 3B CHRONIC KIDNEY DISEASE (HCC): ICD-10-CM

## 2025-03-31 DIAGNOSIS — I25.10 ASCVD (ARTERIOSCLEROTIC CARDIOVASCULAR DISEASE): Primary | ICD-10-CM

## 2025-03-31 DIAGNOSIS — M06.09 RHEUMATOID ARTHRITIS OF MULTIPLE SITES WITH NEGATIVE RHEUMATOID FACTOR (HCC): ICD-10-CM

## 2025-03-31 DIAGNOSIS — R09.89 BRUIT OF RIGHT CAROTID ARTERY: ICD-10-CM

## 2025-03-31 DIAGNOSIS — E78.5 DYSLIPIDEMIA: ICD-10-CM

## 2025-03-31 DIAGNOSIS — I50.32 CHRONIC DIASTOLIC CONGESTIVE HEART FAILURE (HCC): ICD-10-CM

## 2025-03-31 DIAGNOSIS — I10 ESSENTIAL HYPERTENSION: ICD-10-CM

## 2025-03-31 PROBLEM — M25.473 ANKLE SWELLING DETERMINED BY EXAMINATION: Status: RESOLVED | Noted: 2019-03-12 | Resolved: 2025-03-31

## 2025-03-31 PROBLEM — I20.9 ANGINA PECTORIS, UNSPECIFIED: Status: RESOLVED | Noted: 2023-08-08 | Resolved: 2025-03-31

## 2025-03-31 PROCEDURE — 1123F ACP DISCUSS/DSCN MKR DOCD: CPT | Performed by: NURSE PRACTITIONER

## 2025-03-31 PROCEDURE — G8399 PT W/DXA RESULTS DOCUMENT: HCPCS | Performed by: NURSE PRACTITIONER

## 2025-03-31 PROCEDURE — 3077F SYST BP >= 140 MM HG: CPT | Performed by: NURSE PRACTITIONER

## 2025-03-31 PROCEDURE — 93000 ELECTROCARDIOGRAM COMPLETE: CPT | Performed by: NURSE PRACTITIONER

## 2025-03-31 PROCEDURE — G8427 DOCREV CUR MEDS BY ELIG CLIN: HCPCS | Performed by: NURSE PRACTITIONER

## 2025-03-31 PROCEDURE — G8417 CALC BMI ABV UP PARAM F/U: HCPCS | Performed by: NURSE PRACTITIONER

## 2025-03-31 PROCEDURE — 99214 OFFICE O/P EST MOD 30 MIN: CPT | Performed by: NURSE PRACTITIONER

## 2025-03-31 PROCEDURE — 1159F MED LIST DOCD IN RCRD: CPT | Performed by: NURSE PRACTITIONER

## 2025-03-31 PROCEDURE — 3078F DIAST BP <80 MM HG: CPT | Performed by: NURSE PRACTITIONER

## 2025-03-31 PROCEDURE — 1090F PRES/ABSN URINE INCON ASSESS: CPT | Performed by: NURSE PRACTITIONER

## 2025-03-31 PROCEDURE — 1036F TOBACCO NON-USER: CPT | Performed by: NURSE PRACTITIONER

## 2025-03-31 RX ORDER — ELECTROLYTES/DEXTROSE
1 SOLUTION, ORAL ORAL
COMMUNITY

## 2025-03-31 ASSESSMENT — ENCOUNTER SYMPTOMS
COUGH: 0
SHORTNESS OF BREATH: 0

## 2025-03-31 NOTE — PROGRESS NOTES
CLINICAL STAFF DOCUMENTATION    Mae Ceja, ALINE     Eun SPEARS [place]  1945  [id number]    Have you had any Chest Pain recently? - No  Have you had any Shortness of Breath - No  Have you had any dizziness - No  Have you had any palpitations recently? - No  Do you have any edema - swelling in feet  & ankles consistently   When did you have your last labs drawn 9/3/24 lipid panel  What doctor ordered Unknown  Do we have the labs in their chart Yes  Do you need any prescriptions refilled? -  Patient stated she doesn't think so.  Do you have a surgery or procedure scheduled in the near future - No  Do use tobacco products? - No  Do you drink alcohol? - No  Do you use any illicit drugs? - No  Caffeine? - No     Check medication list thoroughly!!! AND RECONCILE OUTSIDE MEDICATIONS  If dose has changed change the entire order not just the MG  BE SURE TO ASK PATIENT IF THEY NEED MEDICATION REFILLS  Verify Pharmacy and update if incorrect    Add to every patient's \"wrap up\" the following dot phrase AFTERVISITCARDIOHEARTHOUSE and ensure we explain this to our patients

## 2025-04-09 ENCOUNTER — HOSPITAL ENCOUNTER (OUTPATIENT)
Dept: WOMENS IMAGING | Age: 80
Discharge: HOME | End: 2025-04-09
Attending: FAMILY MEDICINE
Payer: MEDICARE

## 2025-04-09 VITALS — WEIGHT: 176 LBS | BODY MASS INDEX: 33.23 KG/M2 | HEIGHT: 61 IN

## 2025-04-09 DIAGNOSIS — Z12.31 ENCOUNTER FOR SCREENING MAMMOGRAM FOR MALIGNANT NEOPLASM OF BREAST: ICD-10-CM

## 2025-04-09 PROCEDURE — 77063 BREAST TOMOSYNTHESIS BI: CPT

## 2025-04-25 ENCOUNTER — HOSPITAL ENCOUNTER (OUTPATIENT)
Dept: ULTRASOUND IMAGING | Age: 80
Discharge: HOME | End: 2025-04-25
Payer: MEDICARE

## 2025-04-25 ENCOUNTER — HOSPITAL ENCOUNTER (OUTPATIENT)
Dept: WOMENS IMAGING | Age: 80
Discharge: HOME | End: 2025-04-25
Payer: MEDICARE

## 2025-04-25 DIAGNOSIS — R92.8 ABNORMAL MAMMOGRAM: ICD-10-CM

## 2025-04-25 PROCEDURE — 76642 ULTRASOUND BREAST LIMITED: CPT

## 2025-04-25 PROCEDURE — G0279 TOMOSYNTHESIS, MAMMO: HCPCS

## 2025-05-07 ENCOUNTER — HOSPITAL ENCOUNTER (OUTPATIENT)
Dept: WOMENS IMAGING | Age: 80
Discharge: HOME | End: 2025-05-07
Attending: FAMILY MEDICINE
Payer: MEDICARE

## 2025-05-07 DIAGNOSIS — Z13.820 ENCOUNTER FOR SCREENING FOR OSTEOPOROSIS: ICD-10-CM

## 2025-05-07 DIAGNOSIS — M81.0 AGE-RELATED OSTEOPOROSIS WITHOUT CURRENT PATHOLOGICAL FRACTURE: ICD-10-CM

## 2025-05-07 PROCEDURE — 77080 DXA BONE DENSITY AXIAL: CPT

## 2025-07-07 ENCOUNTER — OFFICE VISIT (OUTPATIENT)
Dept: CARDIOLOGY CLINIC | Age: 80
End: 2025-07-07
Payer: MEDICARE

## 2025-07-07 VITALS
HEIGHT: 61 IN | WEIGHT: 162 LBS | HEART RATE: 56 BPM | BODY MASS INDEX: 30.58 KG/M2 | DIASTOLIC BLOOD PRESSURE: 58 MMHG | SYSTOLIC BLOOD PRESSURE: 114 MMHG

## 2025-07-07 DIAGNOSIS — I25.10 ASCVD (ARTERIOSCLEROTIC CARDIOVASCULAR DISEASE): Primary | ICD-10-CM

## 2025-07-07 DIAGNOSIS — I10 ESSENTIAL HYPERTENSION: ICD-10-CM

## 2025-07-07 DIAGNOSIS — I50.32 CHRONIC DIASTOLIC CONGESTIVE HEART FAILURE (HCC): ICD-10-CM

## 2025-07-07 DIAGNOSIS — R09.89 BRUIT OF RIGHT CAROTID ARTERY: ICD-10-CM

## 2025-07-07 PROCEDURE — G8399 PT W/DXA RESULTS DOCUMENT: HCPCS | Performed by: NURSE PRACTITIONER

## 2025-07-07 PROCEDURE — G8427 DOCREV CUR MEDS BY ELIG CLIN: HCPCS | Performed by: NURSE PRACTITIONER

## 2025-07-07 PROCEDURE — 1160F RVW MEDS BY RX/DR IN RCRD: CPT | Performed by: NURSE PRACTITIONER

## 2025-07-07 PROCEDURE — 3078F DIAST BP <80 MM HG: CPT | Performed by: NURSE PRACTITIONER

## 2025-07-07 PROCEDURE — 1090F PRES/ABSN URINE INCON ASSESS: CPT | Performed by: NURSE PRACTITIONER

## 2025-07-07 PROCEDURE — 3074F SYST BP LT 130 MM HG: CPT | Performed by: NURSE PRACTITIONER

## 2025-07-07 PROCEDURE — 1159F MED LIST DOCD IN RCRD: CPT | Performed by: NURSE PRACTITIONER

## 2025-07-07 PROCEDURE — 99214 OFFICE O/P EST MOD 30 MIN: CPT | Performed by: NURSE PRACTITIONER

## 2025-07-07 PROCEDURE — G8417 CALC BMI ABV UP PARAM F/U: HCPCS | Performed by: NURSE PRACTITIONER

## 2025-07-07 PROCEDURE — 93000 ELECTROCARDIOGRAM COMPLETE: CPT | Performed by: NURSE PRACTITIONER

## 2025-07-07 PROCEDURE — 1036F TOBACCO NON-USER: CPT | Performed by: NURSE PRACTITIONER

## 2025-07-07 PROCEDURE — 1123F ACP DISCUSS/DSCN MKR DOCD: CPT | Performed by: NURSE PRACTITIONER

## 2025-07-07 RX ORDER — NITROGLYCERIN 0.4 MG/1
TABLET SUBLINGUAL
Qty: 25 TABLET | Refills: 0 | Status: SHIPPED | OUTPATIENT
Start: 2025-07-07

## 2025-07-07 RX ORDER — CLOPIDOGREL BISULFATE 75 MG/1
75 TABLET ORAL DAILY
Qty: 90 TABLET | Refills: 3 | Status: SHIPPED | OUTPATIENT
Start: 2025-07-07

## 2025-07-07 ASSESSMENT — ENCOUNTER SYMPTOMS
SHORTNESS OF BREATH: 0
COUGH: 0

## 2025-07-07 NOTE — PATIENT INSTRUCTIONS
Thank you for allowing us to care for you today!   We want to ensure we can follow your treatment plan and we strive to give you the best outcomes and experience possible.   If you ever have a life threatening emergency and call 911 - for an ambulance (EMS)  REMEMBER  Our providers can only care for you at:   [facility] or [facility]   Even if you have someone take you or you drive yourself we can only care for you in a [facility] facility. Our providers are not setup at the other healthcare locations!    PLEASE CALL OUR OFFICE DURING NORMAL BUSINESS HOURS  Monday through Friday 8 am to 5 pm  AFTER HOURS the physician on-call cannot help with scheduling, rescheduling, procedure instruction questions or any type of medication need or issue.  [facility] P:[phone] - [facility] P:[phone] - [facility] P:[phone]      If you receive a survey:  We would appreciate you taking the time to share your experience concerning your provider visit in the office.    These surveys are confidential!  We are eager to improve and are counting on you to share your feedback so we can ensure you get the best care possible.

## 2025-07-07 NOTE — PROGRESS NOTES
CARDIOLOGY  NOTE    2025    Eun Iraheta (:  1945) is a 79 y.o. female,an established patient with Dr. Guillen, here for evaluation of the following chief complaint(s):  3 Month Follow-Up        SUBJECTIVE/OBJECTIVE:  History of Present Illness  The patient is an 79-year-old female who presents for hypertension and abnormal EKG.    She reports no new health issues or changes in her medication regimen. She is not experiencing any episodes of dizziness or fainting. She has not required the use of hydralazine. Her current medications include nifedipine, losartan, half a tablet of chlorthalidone at night, and carvedilol.    She has not needed to use nitroglycerin and is not experiencing any chest pain. She also reports no signs of fluid retention.    Eun has a history of Essential hypertension, Rheumatoid arthritis of multiple sites with negative rheumatoid factor (HCC), Stage 3 chronic kidney disease (HCC), Chronic diastolic congestive heart failure (HCC), ASCVD (arteriosclerotic cardiovascular disease), Dyslipidemia, Bruit of right carotid artery    Eun is a non smoker. she denies any issues with obtaining taking or side effects from medications.        Review of Systems   Constitutional:  Negative for fatigue and fever.   Respiratory:  Negative for cough and shortness of breath.    Cardiovascular:  Negative for chest pain, palpitations and leg swelling.   Musculoskeletal:  Negative for arthralgias and gait problem.   Neurological:  Negative for dizziness, syncope, weakness, light-headedness and headaches.       Vitals:    25 0808   BP: (!) 114/58   BP Site: Left Upper Arm   Patient Position: Sitting   BP Cuff Size: Medium Adult   Pulse: 56   Weight: 73.5 kg (162 lb)   Height: 1.549 m (5' 1\")       Wt Readings from Last 3 Encounters:   25 73.5 kg (162 lb)   25 79.8 kg (176 lb)   25 76.6 kg (168 lb 12.8 oz)       BP Readings from Last 3 Encounters:   25 (!)

## 2025-07-07 NOTE — PROGRESS NOTES
CLINICAL STAFF DOCUMENTATION    Mae Ceja, ALINE     Eun SPEARS [place]  1945  [id number]    Have you had any Chest Pain recently? - No        Have you had any Shortness of Breath - No    Have you had any dizziness - No    Have you had any palpitations recently? - No    Any thyroid issues? - No    Do you have any edema - swelling in No            When did you have your last labs drawn 3/4/25    Do we have the labs in their chart Yes      Do you need any prescriptions refilled? - Yes      Do use tobacco products? - No  Do you drink alcohol? - No  Do you use any illicit drugs? - No  Caffeine? - Yes  How much caffeine? .1  cups       Check medication list thoroughly!!! AND RECONCILE OUTSIDE MEDICATIONS  If dose has changed change the entire order not just the MG  BE SURE TO ASK PATIENT IF THEY NEED MEDICATION REFILLS  Verify Pharmacy and update if incorrect    Add to every patient's \"wrap up\" the following dot phrase AFTERVISITCARDIOHEARTHOUSE and ensure we explain this to our patients

## 2025-08-27 ENCOUNTER — TRANSCRIBE ORDERS (OUTPATIENT)
Dept: ADMINISTRATIVE | Age: 80
End: 2025-08-27

## 2025-08-27 DIAGNOSIS — R01.1 CARDIAC MURMUR: ICD-10-CM

## 2025-08-27 DIAGNOSIS — I25.10 ARTERIOSCLEROTIC HEART DISEASE: ICD-10-CM

## 2025-08-27 DIAGNOSIS — I50.9 HEART FAILURE, UNSPECIFIED HF CHRONICITY, UNSPECIFIED HEART FAILURE TYPE (HCC): Primary | ICD-10-CM

## 2025-08-27 DIAGNOSIS — I35.1 NONRHEUMATIC AORTIC VALVE INSUFFICIENCY: ICD-10-CM

## 2025-09-05 ENCOUNTER — HOSPITAL ENCOUNTER (OUTPATIENT)
Dept: NON INVASIVE DIAGNOSTICS | Age: 80
Discharge: HOME | End: 2025-09-05
Attending: FAMILY MEDICINE
Payer: MEDICARE

## 2025-09-05 VITALS
BODY MASS INDEX: 30.58 KG/M2 | WEIGHT: 162 LBS | HEIGHT: 61 IN | SYSTOLIC BLOOD PRESSURE: 114 MMHG | HEART RATE: 56 BPM | DIASTOLIC BLOOD PRESSURE: 58 MMHG

## 2025-09-05 DIAGNOSIS — I35.1 NONRHEUMATIC AORTIC VALVE INSUFFICIENCY: ICD-10-CM

## 2025-09-05 DIAGNOSIS — R01.1 CARDIAC MURMUR: ICD-10-CM

## 2025-09-05 DIAGNOSIS — I50.9 HEART FAILURE, UNSPECIFIED HF CHRONICITY, UNSPECIFIED HEART FAILURE TYPE (HCC): ICD-10-CM

## 2025-09-05 DIAGNOSIS — I25.10 ARTERIOSCLEROTIC HEART DISEASE: ICD-10-CM

## 2025-09-05 LAB
ECHO AO ROOT DIAM: 2.7 CM
ECHO AO ROOT INDEX: 1.56 CM/M2
ECHO AR MAX VEL PISA: 4.5 M/S
ECHO AV AREA PEAK VELOCITY: 1.8 CM2
ECHO AV AREA VTI: 1.9 CM2
ECHO AV AREA/BSA PEAK VELOCITY: 1 CM2/M2
ECHO AV AREA/BSA VTI: 1.1 CM2/M2
ECHO AV MEAN GRADIENT: 8 MMHG
ECHO AV MEAN VELOCITY: 1.3 M/S
ECHO AV PEAK GRADIENT: 14 MMHG
ECHO AV PEAK VELOCITY: 1.9 M/S
ECHO AV REGURGITANT PHT: 474 MS
ECHO AV VELOCITY RATIO: 0.63
ECHO AV VTI: 46.1 CM
ECHO BSA: 1.78 M2
ECHO EST RA PRESSURE: 3 MMHG
ECHO IVC PROX: 1.8 CM
ECHO LA AREA 4C: 22 CM2
ECHO LA DIAMETER INDEX: 2.37 CM/M2
ECHO LA DIAMETER: 4.1 CM
ECHO LA MAJOR AXIS: 6.6 CM
ECHO LA TO AORTIC ROOT RATIO: 1.52
ECHO LA VOL MOD A4C: 60 ML (ref 22–52)
ECHO LA VOLUME INDEX MOD A4C: 35 ML/M2 (ref 16–34)
ECHO LV E' LATERAL VELOCITY: 10.9 CM/S
ECHO LV E' SEPTAL VELOCITY: 10.4 CM/S
ECHO LV EDV A4C: 79 ML
ECHO LV EDV INDEX A4C: 46 ML/M2
ECHO LV EF PHYSICIAN: 55 %
ECHO LV EJECTION FRACTION A4C: 59 %
ECHO LV ESV A4C: 33 ML
ECHO LV ESV INDEX A4C: 19 ML/M2
ECHO LV FRACTIONAL SHORTENING: 44 % (ref 28–44)
ECHO LV INTERNAL DIMENSION DIASTOLE INDEX: 2.89 CM/M2
ECHO LV INTERNAL DIMENSION DIASTOLIC: 5 CM (ref 3.9–5.3)
ECHO LV INTERNAL DIMENSION SYSTOLIC INDEX: 1.62 CM/M2
ECHO LV INTERNAL DIMENSION SYSTOLIC: 2.8 CM
ECHO LV IVSD: 1 CM (ref 0.6–0.9)
ECHO LV MASS 2D: 182 G (ref 67–162)
ECHO LV MASS INDEX 2D: 105.2 G/M2 (ref 43–95)
ECHO LV POSTERIOR WALL DIASTOLIC: 1 CM (ref 0.6–0.9)
ECHO LV RELATIVE WALL THICKNESS RATIO: 0.4
ECHO LVOT AREA: 2.8 CM2
ECHO LVOT AV VTI INDEX: 0.67
ECHO LVOT DIAM: 1.9 CM
ECHO LVOT MEAN GRADIENT: 3 MMHG
ECHO LVOT PEAK GRADIENT: 5 MMHG
ECHO LVOT PEAK VELOCITY: 1.2 M/S
ECHO LVOT STROKE VOLUME INDEX: 50.6 ML/M2
ECHO LVOT SV: 87.6 ML
ECHO LVOT VTI: 30.9 CM
ECHO MV A VELOCITY: 1.13 M/S
ECHO MV E VELOCITY: 1.2 M/S
ECHO MV E/A RATIO: 1.06
ECHO MV E/E' LATERAL: 11.01
ECHO MV E/E' RATIO (AVERAGED): 11.27
ECHO MV E/E' SEPTAL: 11.54
ECHO RIGHT VENTRICULAR SYSTOLIC PRESSURE (RVSP): 14 MMHG
ECHO RV MID DIMENSION: 2.4 CM
ECHO TV REGURGITANT MAX VELOCITY: 1.65 M/S
ECHO TV REGURGITANT PEAK GRADIENT: 11 MMHG

## 2025-09-05 PROCEDURE — 93306 TTE W/DOPPLER COMPLETE: CPT
